# Patient Record
Sex: MALE | Race: WHITE | NOT HISPANIC OR LATINO | URBAN - METROPOLITAN AREA
[De-identification: names, ages, dates, MRNs, and addresses within clinical notes are randomized per-mention and may not be internally consistent; named-entity substitution may affect disease eponyms.]

---

## 2018-04-04 ENCOUNTER — OUTPATIENT (OUTPATIENT)
Dept: OUTPATIENT SERVICES | Facility: HOSPITAL | Age: 63
LOS: 1 days | Discharge: HOME | End: 2018-04-04

## 2018-04-04 VITALS
DIASTOLIC BLOOD PRESSURE: 97 MMHG | RESPIRATION RATE: 15 BRPM | HEART RATE: 68 BPM | TEMPERATURE: 98 F | WEIGHT: 205.03 LBS | OXYGEN SATURATION: 98 % | SYSTOLIC BLOOD PRESSURE: 175 MMHG | HEIGHT: 70 IN

## 2018-04-04 LAB
BASOPHILS # BLD AUTO: 0.04 K/UL — SIGNIFICANT CHANGE UP (ref 0–0.2)
BASOPHILS NFR BLD AUTO: 0.5 % — SIGNIFICANT CHANGE UP (ref 0–1)
EOSINOPHIL # BLD AUTO: 0.05 K/UL — SIGNIFICANT CHANGE UP (ref 0–0.7)
EOSINOPHIL NFR BLD AUTO: 0.7 % — SIGNIFICANT CHANGE UP (ref 0–8)
HCT VFR BLD CALC: 34.8 % — LOW (ref 42–52)
HCT VFR BLD CALC: 37.2 % — LOW (ref 42–52)
HGB BLD-MCNC: 11.8 G/DL — LOW (ref 14–18)
HGB BLD-MCNC: 12.6 G/DL — LOW (ref 14–18)
IMM GRANULOCYTES NFR BLD AUTO: 0.3 % — SIGNIFICANT CHANGE UP (ref 0.1–0.3)
LYMPHOCYTES # BLD AUTO: 1.53 K/UL — SIGNIFICANT CHANGE UP (ref 1.2–3.4)
LYMPHOCYTES # BLD AUTO: 20.1 % — LOW (ref 20.5–51.1)
MCHC RBC-ENTMCNC: 28.7 PG — SIGNIFICANT CHANGE UP (ref 27–31)
MCHC RBC-ENTMCNC: 28.7 PG — SIGNIFICANT CHANGE UP (ref 27–31)
MCHC RBC-ENTMCNC: 33.9 G/DL — SIGNIFICANT CHANGE UP (ref 32–37)
MCHC RBC-ENTMCNC: 33.9 G/DL — SIGNIFICANT CHANGE UP (ref 32–37)
MCV RBC AUTO: 84.7 FL — SIGNIFICANT CHANGE UP (ref 80–94)
MCV RBC AUTO: 84.7 FL — SIGNIFICANT CHANGE UP (ref 80–94)
MONOCYTES # BLD AUTO: 0.68 K/UL — HIGH (ref 0.1–0.6)
MONOCYTES NFR BLD AUTO: 8.9 % — SIGNIFICANT CHANGE UP (ref 1.7–9.3)
NEUTROPHILS # BLD AUTO: 5.28 K/UL — SIGNIFICANT CHANGE UP (ref 1.4–6.5)
NEUTROPHILS NFR BLD AUTO: 69.5 % — SIGNIFICANT CHANGE UP (ref 42.2–75.2)
NRBC # BLD: 0 /100 WBCS — SIGNIFICANT CHANGE UP (ref 0–0)
NRBC # BLD: 0 /100 WBCS — SIGNIFICANT CHANGE UP (ref 0–0)
PLATELET # BLD AUTO: 176 K/UL — SIGNIFICANT CHANGE UP (ref 130–400)
PLATELET # BLD AUTO: 179 K/UL — SIGNIFICANT CHANGE UP (ref 130–400)
RBC # BLD: 4.11 M/UL — LOW (ref 4.7–6.1)
RBC # BLD: 4.39 M/UL — LOW (ref 4.7–6.1)
RBC # FLD: 12.5 % — SIGNIFICANT CHANGE UP (ref 11.5–14.5)
RBC # FLD: 12.6 % — SIGNIFICANT CHANGE UP (ref 11.5–14.5)
WBC # BLD: 7.6 K/UL — SIGNIFICANT CHANGE UP (ref 4.8–10.8)
WBC # BLD: 7.7 K/UL — SIGNIFICANT CHANGE UP (ref 4.8–10.8)
WBC # FLD AUTO: 7.6 K/UL — SIGNIFICANT CHANGE UP (ref 4.8–10.8)
WBC # FLD AUTO: 7.7 K/UL — SIGNIFICANT CHANGE UP (ref 4.8–10.8)

## 2018-04-04 RX ORDER — PRASUGREL 5 MG/1
1 TABLET, FILM COATED ORAL
Qty: 30 | Refills: 1
Start: 2018-04-04

## 2018-04-04 RX ORDER — ASPIRIN/CALCIUM CARB/MAGNESIUM 324 MG
81 TABLET ORAL ONCE
Qty: 0 | Refills: 0 | Status: COMPLETED | OUTPATIENT
Start: 2018-04-04 | End: 2018-04-04

## 2018-04-04 RX ORDER — SODIUM CHLORIDE 9 MG/ML
500 INJECTION INTRAMUSCULAR; INTRAVENOUS; SUBCUTANEOUS
Qty: 0 | Refills: 0 | Status: DISCONTINUED | OUTPATIENT
Start: 2018-04-04 | End: 2018-04-19

## 2018-04-04 RX ORDER — METOPROLOL TARTRATE 50 MG
1 TABLET ORAL
Qty: 30 | Refills: 0
Start: 2018-04-04

## 2018-04-04 RX ADMIN — Medication 81 MILLIGRAM(S): at 11:25

## 2018-04-04 NOTE — H&P CARDIOLOGY - HISTORY OF PRESENT ILLNESS
62y old male patient with a past medical history of MVP, history of Rheumatic fever as a child and hyperlipidemia, presenting for a left heart and coronary angiogram to assess for CAD.  Outpatient stress test revealed mild dilatation of the LV. Mild to moderate MR.   No current complaints  Denies any chest pain or shortness of breath.

## 2018-04-04 NOTE — PROGRESS NOTE ADULT - SUBJECTIVE AND OBJECTIVE BOX
PRE-OPERATIVE DAY OF PROCEDURE EVALUATION DOCOMENTATION  I have personally seen and examined the patient.  I agree with the history and physical which I have reviewed and noted any changes below.  SIGNED ELECTRONICALLY BY NASH KAUR MD 04-04-18 @ 16:01                                         POST OPERATIVE PROCEDURAL DOCUMENTATION  PRE-OP DIAGNOSIS:       PROCEDURE:   LEFT HEART CATHERIZATION    Physician:  Nash Kaur MD  Assistant:  Yoko LESTER    ANESTHESIA TYPE:  [ X] Sedation  [ X] Local/Regional  [   ]General Anesthesia    ESTIMATED BLOOD LOSS:      less than 10 mL    CONDITION  [X ] Good  [   ] Fair  [   ] Serious  [   ] Critical      SPECIMENS REMOVED (IF APPLICABLE):   wire        IMPLANTS (IF APPLICABLE) stents x 3      FINDINGS:  LEFT HEART CATHERIZATION                                    LVEF55%:  LVEDP:    Left main nl    LAD:  mid 30-40%                        Left Circumflex: Patent      Right Cornary Artery:  Proximal 70% stent 3.5 x 22 martha  Distal 90% 2.75 x 22 martha        RIGHT HEART CATHERIZATION ( Not Performed)  PA:  PCW:  CO/CI:    PERCUTANEOUS CORONARY INTERVENTIONS:      COMPLICATIONS:  None      POST-OP DIAGNOSIS CAD    [ ] Normal Coronary Arteries  [ ] Luminal Irregularities  [ ] Non-obstructive CAD        PLAN OF CARE    [x ] D/C Home today   [ ]  D/C in AM  [ ] Return to In-patient bed  [ ] Admit for observation  [ ] Return for staged procedure:  [ ] CT Surgery consult called  [ ]  Continue DAPT, B-blocker & Statin therapy  [ ]  Medical Therapy  [ X] Aggressive risk factor modification. The patient should follow a low fat and low calorie diet.

## 2018-04-16 DIAGNOSIS — Z95.5 PRESENCE OF CORONARY ANGIOPLASTY IMPLANT AND GRAFT: ICD-10-CM

## 2018-04-16 DIAGNOSIS — I34.1 NONRHEUMATIC MITRAL (VALVE) PROLAPSE: ICD-10-CM

## 2018-04-16 DIAGNOSIS — I25.10 ATHEROSCLEROTIC HEART DISEASE OF NATIVE CORONARY ARTERY WITHOUT ANGINA PECTORIS: ICD-10-CM

## 2018-04-16 DIAGNOSIS — I42.9 CARDIOMYOPATHY, UNSPECIFIED: ICD-10-CM

## 2018-04-18 DIAGNOSIS — E11.9 TYPE 2 DIABETES MELLITUS WITHOUT COMPLICATIONS: ICD-10-CM

## 2018-04-18 DIAGNOSIS — G47.33 OBSTRUCTIVE SLEEP APNEA (ADULT) (PEDIATRIC): ICD-10-CM

## 2018-04-18 DIAGNOSIS — E78.00 PURE HYPERCHOLESTEROLEMIA, UNSPECIFIED: ICD-10-CM

## 2018-04-18 DIAGNOSIS — I10 ESSENTIAL (PRIMARY) HYPERTENSION: ICD-10-CM

## 2018-04-18 DIAGNOSIS — I25.2 OLD MYOCARDIAL INFARCTION: ICD-10-CM

## 2020-01-23 PROBLEM — I00 RHEUMATIC FEVER WITHOUT HEART INVOLVEMENT: Chronic | Status: ACTIVE | Noted: 2018-04-04

## 2020-01-23 PROBLEM — E78.5 HYPERLIPIDEMIA, UNSPECIFIED: Chronic | Status: ACTIVE | Noted: 2018-04-04

## 2020-01-23 PROBLEM — I34.1 NONRHEUMATIC MITRAL (VALVE) PROLAPSE: Chronic | Status: ACTIVE | Noted: 2018-04-04

## 2020-01-29 ENCOUNTER — INPATIENT (INPATIENT)
Facility: HOSPITAL | Age: 65
LOS: 0 days | Discharge: HOME | End: 2020-01-30
Attending: INTERNAL MEDICINE | Admitting: INTERNAL MEDICINE
Payer: MEDICARE

## 2020-01-29 ENCOUNTER — OUTPATIENT (OUTPATIENT)
Dept: OUTPATIENT SERVICES | Facility: HOSPITAL | Age: 65
LOS: 1 days | Discharge: HOME | End: 2020-01-29

## 2020-01-29 VITALS
DIASTOLIC BLOOD PRESSURE: 74 MMHG | HEART RATE: 74 BPM | HEIGHT: 70 IN | TEMPERATURE: 97 F | RESPIRATION RATE: 20 BRPM | WEIGHT: 213.41 LBS | SYSTOLIC BLOOD PRESSURE: 157 MMHG

## 2020-01-29 DIAGNOSIS — Z87.891 PERSONAL HISTORY OF NICOTINE DEPENDENCE: ICD-10-CM

## 2020-01-29 DIAGNOSIS — Z95.5 PRESENCE OF CORONARY ANGIOPLASTY IMPLANT AND GRAFT: ICD-10-CM

## 2020-01-29 DIAGNOSIS — E78.5 HYPERLIPIDEMIA, UNSPECIFIED: ICD-10-CM

## 2020-01-29 DIAGNOSIS — Z90.5 ACQUIRED ABSENCE OF KIDNEY: Chronic | ICD-10-CM

## 2020-01-29 DIAGNOSIS — I25.118 ATHEROSCLEROTIC HEART DISEASE OF NATIVE CORONARY ARTERY WITH OTHER FORMS OF ANGINA PECTORIS: ICD-10-CM

## 2020-01-29 DIAGNOSIS — I34.1 NONRHEUMATIC MITRAL (VALVE) PROLAPSE: ICD-10-CM

## 2020-01-29 DIAGNOSIS — I10 ESSENTIAL (PRIMARY) HYPERTENSION: ICD-10-CM

## 2020-01-29 DIAGNOSIS — I42.9 CARDIOMYOPATHY, UNSPECIFIED: ICD-10-CM

## 2020-01-29 DIAGNOSIS — Z79.82 LONG TERM (CURRENT) USE OF ASPIRIN: ICD-10-CM

## 2020-01-29 DIAGNOSIS — I20.8 OTHER FORMS OF ANGINA PECTORIS: ICD-10-CM

## 2020-01-29 LAB
ANION GAP SERPL CALC-SCNC: 11 MMOL/L — SIGNIFICANT CHANGE UP (ref 7–14)
ANION GAP SERPL CALC-SCNC: 14 MMOL/L — SIGNIFICANT CHANGE UP (ref 7–14)
BUN SERPL-MCNC: 14 MG/DL — SIGNIFICANT CHANGE UP (ref 10–20)
BUN SERPL-MCNC: 17 MG/DL — SIGNIFICANT CHANGE UP (ref 10–20)
CALCIUM SERPL-MCNC: 9.6 MG/DL — SIGNIFICANT CHANGE UP (ref 8.5–10.1)
CALCIUM SERPL-MCNC: 9.8 MG/DL — SIGNIFICANT CHANGE UP (ref 8.5–10.1)
CHLORIDE SERPL-SCNC: 104 MMOL/L — SIGNIFICANT CHANGE UP (ref 98–110)
CHLORIDE SERPL-SCNC: 105 MMOL/L — SIGNIFICANT CHANGE UP (ref 98–110)
CO2 SERPL-SCNC: 24 MMOL/L — SIGNIFICANT CHANGE UP (ref 17–32)
CO2 SERPL-SCNC: 24 MMOL/L — SIGNIFICANT CHANGE UP (ref 17–32)
CREAT SERPL-MCNC: 0.9 MG/DL — SIGNIFICANT CHANGE UP (ref 0.7–1.5)
CREAT SERPL-MCNC: 0.9 MG/DL — SIGNIFICANT CHANGE UP (ref 0.7–1.5)
GLUCOSE SERPL-MCNC: 90 MG/DL — SIGNIFICANT CHANGE UP (ref 70–99)
GLUCOSE SERPL-MCNC: 93 MG/DL — SIGNIFICANT CHANGE UP (ref 70–99)
HCT VFR BLD CALC: 37.1 % — LOW (ref 42–52)
HCT VFR BLD CALC: 39.4 % — LOW (ref 42–52)
HGB BLD-MCNC: 12.7 G/DL — LOW (ref 14–18)
HGB BLD-MCNC: 13.6 G/DL — LOW (ref 14–18)
MCHC RBC-ENTMCNC: 29.7 PG — SIGNIFICANT CHANGE UP (ref 27–31)
MCHC RBC-ENTMCNC: 30.2 PG — SIGNIFICANT CHANGE UP (ref 27–31)
MCHC RBC-ENTMCNC: 34.2 G/DL — SIGNIFICANT CHANGE UP (ref 32–37)
MCHC RBC-ENTMCNC: 34.5 G/DL — SIGNIFICANT CHANGE UP (ref 32–37)
MCV RBC AUTO: 86.7 FL — SIGNIFICANT CHANGE UP (ref 80–94)
MCV RBC AUTO: 87.6 FL — SIGNIFICANT CHANGE UP (ref 80–94)
NRBC # BLD: 0 /100 WBCS — SIGNIFICANT CHANGE UP (ref 0–0)
NRBC # BLD: 0 /100 WBCS — SIGNIFICANT CHANGE UP (ref 0–0)
PLATELET # BLD AUTO: 209 K/UL — SIGNIFICANT CHANGE UP (ref 130–400)
PLATELET # BLD AUTO: 214 K/UL — SIGNIFICANT CHANGE UP (ref 130–400)
POTASSIUM SERPL-MCNC: 4 MMOL/L — SIGNIFICANT CHANGE UP (ref 3.5–5)
POTASSIUM SERPL-MCNC: 4.2 MMOL/L — SIGNIFICANT CHANGE UP (ref 3.5–5)
POTASSIUM SERPL-SCNC: 4 MMOL/L — SIGNIFICANT CHANGE UP (ref 3.5–5)
POTASSIUM SERPL-SCNC: 4.2 MMOL/L — SIGNIFICANT CHANGE UP (ref 3.5–5)
RBC # BLD: 4.28 M/UL — LOW (ref 4.7–6.1)
RBC # BLD: 4.5 M/UL — LOW (ref 4.7–6.1)
RBC # FLD: 12.1 % — SIGNIFICANT CHANGE UP (ref 11.5–14.5)
RBC # FLD: 12.3 % — SIGNIFICANT CHANGE UP (ref 11.5–14.5)
SODIUM SERPL-SCNC: 139 MMOL/L — SIGNIFICANT CHANGE UP (ref 135–146)
SODIUM SERPL-SCNC: 143 MMOL/L — SIGNIFICANT CHANGE UP (ref 135–146)
WBC # BLD: 6.76 K/UL — SIGNIFICANT CHANGE UP (ref 4.8–10.8)
WBC # BLD: 8.63 K/UL — SIGNIFICANT CHANGE UP (ref 4.8–10.8)
WBC # FLD AUTO: 6.76 K/UL — SIGNIFICANT CHANGE UP (ref 4.8–10.8)
WBC # FLD AUTO: 8.63 K/UL — SIGNIFICANT CHANGE UP (ref 4.8–10.8)

## 2020-01-29 PROCEDURE — 93010 ELECTROCARDIOGRAM REPORT: CPT

## 2020-01-29 RX ORDER — ASPIRIN/CALCIUM CARB/MAGNESIUM 324 MG
81 TABLET ORAL DAILY
Refills: 0 | Status: DISCONTINUED | OUTPATIENT
Start: 2020-01-29 | End: 2020-01-30

## 2020-01-29 RX ORDER — LOSARTAN POTASSIUM 100 MG/1
25 TABLET, FILM COATED ORAL DAILY
Refills: 0 | Status: DISCONTINUED | OUTPATIENT
Start: 2020-01-29 | End: 2020-01-30

## 2020-01-29 RX ORDER — TICAGRELOR 90 MG/1
1 TABLET ORAL
Qty: 60 | Refills: 0
Start: 2020-01-29 | End: 2020-02-27

## 2020-01-29 RX ORDER — SODIUM CHLORIDE 9 MG/ML
1000 INJECTION INTRAMUSCULAR; INTRAVENOUS; SUBCUTANEOUS
Refills: 0 | Status: DISCONTINUED | OUTPATIENT
Start: 2020-01-29 | End: 2020-01-30

## 2020-01-29 RX ORDER — TICAGRELOR 90 MG/1
90 TABLET ORAL EVERY 12 HOURS
Refills: 0 | Status: DISCONTINUED | OUTPATIENT
Start: 2020-01-29 | End: 2020-01-30

## 2020-01-29 RX ORDER — ATORVASTATIN CALCIUM 80 MG/1
40 TABLET, FILM COATED ORAL AT BEDTIME
Refills: 0 | Status: DISCONTINUED | OUTPATIENT
Start: 2020-01-29 | End: 2020-01-30

## 2020-01-29 RX ORDER — ATORVASTATIN CALCIUM 80 MG/1
1 TABLET, FILM COATED ORAL
Qty: 0 | Refills: 0 | DISCHARGE

## 2020-01-29 RX ORDER — METOPROLOL TARTRATE 50 MG
25 TABLET ORAL DAILY
Refills: 0 | Status: DISCONTINUED | OUTPATIENT
Start: 2020-01-29 | End: 2020-01-30

## 2020-01-29 RX ADMIN — TICAGRELOR 90 MILLIGRAM(S): 90 TABLET ORAL at 18:05

## 2020-01-29 NOTE — H&P CARDIOLOGY - HISTORY OF PRESENT ILLNESS
64y Male here for cardiac catheterization. The patient complains of CP on exertion.  Patient has had 1 prior cardiac catheterization.  + ve stress test 01-29-20 @ 12:30    relevant PMHx:  [x ] HTN  [ ] DM   [x ] CAD  [x ] HLD  [x ] Family Hx of CAD    Other:    Pre cath note:    indication:  [ ] STEMI                [ ] NSTEMI                 [ ] Unstable Angina                     [x ] Stable Angina     non-invasive testing:    + ve stress Echo as OP           result: [ ] high risk  [x] intermediate risk  [ ] low risk    Anti- Anginal medications:                    [ ] not used                       [x ] used                   [ ] not used but strong indication not to use    Ejection Fraction                   [ ] >30%            [ ] <30%    COPD                   [ ] mild (on chronic bronchodilators)  [ ] moderate (on chronic steroid therapy)      [ ] severe (indication for home O2 or PACO2 >50)    Other risk factors:                     [ ] Previous MI                    [ ] CVA/ stroke                    [ ] carotid stent/ CEA                    [ ] PVD- (arterial aneurysm, non-palpable pulses, tortuous vessel with inability to insert catheter, infra-renal dissection, renal or subclavian artery stenosis)                    [ ] Renal Failure                    [ ] diabetic                    [ ] previous CABG

## 2020-01-29 NOTE — CHART NOTE - NSCHARTNOTEFT_GEN_A_CORE
Preliminary Cardiac Catheterization Post-Procedure Report:01-29-20 @ 16:02    After risks, benefits and alternatives of the procedure were explained, consent was signed and placed in the medical record prior to initiation of procedure.  Procedural timeout was performed prior to initiation of procedure in presence of cath lab staff. Cardiac catheterization was performed without any significant complications. Post-procedure vital signs were stable.    Procedure Performed:  [x ] Left Heart Catheterization  [ ] Right Heart Catheterization  [x ] Percutaneous Coronary Intervention    Primary Physician: Dr. Vincent MD  Assistant(s): Dr. Brannon MD and  Dr. Jesus MD    Preliminary Procedure Summary (Official full report to follow)    Pre-procedure diagnosis: abnormal stress Echo, Stable Angina CCS 2  Post Procedure Diagnosis/Impression:    Left Heart Catheterization:  approximate EF%: n/a  [ ] Normal Coronary Arteries  [ ] Luminal Irregularities  [ ] non-obstructive CAD  [x] 1 vessel coronary artery disease       Anesthesia Type  [x] conscious sedation  [x] local/regional anesthesia  [  ] general anesthesia    Estimated Blood Loss  [x ] less than 30 ml    Amount of Contrast used: 275 ml    Access  [ ] Rt. Femoral A  [ ] Rt. Femoral V  [x ] Rt. Radial A --> closed with D-stat  [ ] Rt. Brachial V    Condition of patient after procedure  [x] stable  [  ] guarded  [  ] satisfactory     CATH SUMMARY/FINDINGS:  Coronary circulation: The coronary circulation is right dominant. There was 1-vessel coronary artery disease (LAD). Left main: Angiography showed minor luminal irregularities with no flow limiting lesions. LAD: Angiography showed a single discrete lesion. Proximal LAD: There was a diffuse 60 % stenosis. An intervention was performed. Distal vessel angiography showed minor luminal irregularities. 2nd diagonal: The vessel was medium to large sized. Angiography showed minor luminal irregularities with no flow limiting lesions. Proximal circumflex: Angiography showed mild atherosclerosis with no flow limiting lesions. Distal vessel angiography showed minor luminal irregularities. 1st obtuse marginal: Angiography showed minor luminal irregularities with no flow limiting lesions. RCA: Angiography showed minor luminal irregularities with no flow limiting lesions, patent stents in mid and distal RCA.     Lesion intervention: A percutaneous intervention was performed on the lesion in the proximal LAD. This was not a bifurcation lesion. This was an ACC/AHA type C "high risk" lesion for intervention. There was no evidence of the transient no-reflow phenomenon. The residual lesion demonstrated no evidence of thrombus. There was ESTRELLITA 3 flow before the procedure and ESTRELLITA 3 flow after the procedure. There was no dissection.   Vessel setup was performed. A XBLAD 3.5 guiding catheter was used to intubate the vessel. A Volcano Prime Wire Verrata 185cm wire was used to cross the lesion.   Vessel setup was performed. A [employer] Moses Blue 180cm wire was used to protect a branch.   Balloon angioplasty was performed, using a 2.5 x 12 [place] RX balloon, with 5 inflations and a maximum inflation pressure of 12 hattie.   Vessel setup was performed. A BMW wire was used as a cleo wire in the LAD.   iFR was measured. A Volcano Prime Wire Verrata 185cm Doppler flow wire was advanced across the stenosis. By coronary flow reserve measurement, the stenosis is hemodynamically significant (0.89).   Balloon angioplasty was performed, using a 3.0 x 12 [place] Quantum [place] RX balloon, with 4 inflations and a maximum inflation pressure of 10 hattie.   A 3.0 X 18 [place] YOCASTA RX drug-eluting stent was placed across the lesion and deployed at a maximum inflation pressure of 10 hattie.   Balloon angioplasty was performed, using a 3.0 x 12 [place] Euphora balloon, with 3 inflations and a maximum inflation pressure of 12 hattie.   A 3.5 X 18 [place] YOCASTA RX drug-eluting stent was placed across the lesion and deployed at a maximum inflation pressure of 10 hattie.   Balloon angioplasty was performed, using a 3.5 x 12 [place] Euphora balloon, with 1 inflations and a maximum inflation pressure of 14 hattie.   Balloon angioplasty was performed, using a 3.5 x 8 NC Quantum [place] RX balloon, with 6 inflations and a maximum inflation pressure of 20 hattie.     Specimens obtained: n/a    Implants: YOCASTA x 2 (3.0 X 18 [place] YOCASTA RX drug-eluting stent and 3.5 X 18 [place] YOCASTA RX drug-eluting stent)    Follow-up Care:  [ ] D/C Home today  [ ] Return to In-patient bed  [x ] Admit to: telemetry/3c  [ ] Return for staged procedure:  [ ] CT Surgery consult called  [x ] DAPT, statin, BB, ACEI  [x ] intensive medical management  [ ] ** EPS/nephrology evaluation requested Preliminary Cardiac Catheterization Post-Procedure Report:01-29-20 @ 16:02    After risks, benefits and alternatives of the procedure were explained, consent was signed and placed in the medical record prior to initiation of procedure.  Procedural timeout was performed prior to initiation of procedure in presence of cath lab staff. Cardiac catheterization was performed without any significant complications. Post-procedure vital signs were stable.    Procedure Performed:  [x ] Left Heart Catheterization  [ ] Right Heart Catheterization  [x ] Percutaneous Coronary Intervention    Primary Physician: Dr. Vincent MD  Assistant(s): Dr. Brannon MD and  Dr. Jesus MD    Preliminary Procedure Summary (Official full report to follow)    Pre-procedure diagnosis: abnormal stress Echo, Stable Angina CCS 2  Post Procedure Diagnosis/Impression:    Left Heart Catheterization:  approximate EF%: n/a  [ ] Normal Coronary Arteries  [ ] Luminal Irregularities  [ ] non-obstructive CAD  [x] 1 vessel coronary artery disease       Anesthesia Type  [x] conscious sedation  [x] local/regional anesthesia  [  ] general anesthesia    Estimated Blood Loss  [x ] less than 30 ml    Amount of Contrast used: 275 ml    Access  [ ] Rt. Femoral A  [ ] Rt. Femoral V  [x ] Rt. Radial A --> closed with D-stat  [ ] Rt. Brachial V    Condition of patient after procedure  [x] stable  [  ] guarded  [  ] satisfactory     CATH SUMMARY/FINDINGS:  Coronary circulation: The coronary circulation is right dominant. There was 1-vessel coronary artery disease (LAD). Left main: Angiography showed minor luminal irregularities with no flow limiting lesions. LAD: Angiography showed a single discrete lesion. Proximal LAD: There was a diffuse 60 % stenosis. An intervention was performed. Distal vessel angiography showed minor luminal irregularities. 2nd diagonal: The vessel was medium to large sized. Angiography showed minor luminal irregularities with no flow limiting lesions. Proximal circumflex: Angiography showed mild atherosclerosis with no flow limiting lesions. Distal vessel angiography showed minor luminal irregularities. 1st obtuse marginal: Angiography showed minor luminal irregularities with no flow limiting lesions. RCA: Angiography showed minor luminal irregularities with no flow limiting lesions, patent stents in mid and distal RCA.     Lesion intervention: A percutaneous intervention was performed on the lesion in the proximal LAD. This was not a bifurcation lesion. This was an ACC/AHA type C "high risk" lesion for intervention. There was no evidence of the transient no-reflow phenomenon. The residual lesion demonstrated no evidence of thrombus. There was ESTRELLITA 3 flow before the procedure and ESTRELLITA 3 flow after the procedure. There was no dissection.   Vessel setup was performed. A XBLAD 3.5 guiding catheter was used to intubate the vessel. A Volcano Prime Wire Verrata 185cm wire was used to cross the lesion.   Vessel setup was performed. A [employer] Moses Blue 180cm wire was used to protect a branch.   Balloon angioplasty was performed, using a 2.5 x 12 [place] RX balloon, with 5 inflations and a maximum inflation pressure of 12 hattie.   Vessel setup was performed. A BMW wire was used as a cleo wire in the LAD.   iFR was measured. A Volcano Prime Wire Verrata 185cm Doppler flow wire was advanced across the stenosis. By coronary flow reserve measurement, the stenosis is hemodynamically significant (0.89).   Balloon angioplasty was performed, using a 3.0 x 12 NC Quantum [place] RX balloon, with 4 inflations and a maximum inflation pressure of 10 hattie.   A 3.0 X 18 [place] YOCASTA RX drug-eluting stent was placed across the lesion and deployed at a maximum inflation pressure of 10 hattie.   Balloon angioplasty was performed, using a 3.0 x 12 [place] Euphora balloon, with 3 inflations and a maximum inflation pressure of 12 hattie.   A 3.5 X 18 [place] YOCASTA RX drug-eluting stent was placed across the lesion and deployed at a maximum inflation pressure of 10 hattie.   Balloon angioplasty was performed, using a 3.5 x 12 [place] Euphora balloon, with 1 inflations and a maximum inflation pressure of 14 hattie.   Balloon angioplasty was performed, using a 3.5 x 8 NC Quantum [place] RX balloon, with 6 inflations and a maximum inflation pressure of 20 hattie.     Specimens obtained: n/a    Implants: YOCASTA x 2 (3.0 X 18 [place] YOCASTA RX drug-eluting stent and 3.5 X 18 [place] YOCASTA RX drug-eluting stent)    Follow-up Care:  [ ] D/C Home today  [ ] Return to In-patient bed  [x ] Admit to: telemetry/3c  [ ] Return for staged procedure:  [ ] CT Surgery consult called  [x ] DAPT, statin, BB, ACEI  [x ] intensive medical management  [ ] ** EPS/nephrology evaluation requested    agree

## 2020-01-30 ENCOUNTER — TRANSCRIPTION ENCOUNTER (OUTPATIENT)
Age: 65
End: 2020-01-30

## 2020-01-30 VITALS — OXYGEN SATURATION: 97 %

## 2020-01-30 PROCEDURE — 93010 ELECTROCARDIOGRAM REPORT: CPT

## 2020-01-30 RX ADMIN — LOSARTAN POTASSIUM 25 MILLIGRAM(S): 100 TABLET, FILM COATED ORAL at 06:33

## 2020-01-30 RX ADMIN — Medication 81 MILLIGRAM(S): at 11:10

## 2020-01-30 RX ADMIN — TICAGRELOR 90 MILLIGRAM(S): 90 TABLET ORAL at 06:32

## 2020-01-30 RX ADMIN — Medication 25 MILLIGRAM(S): at 06:32

## 2020-01-30 NOTE — DISCHARGE NOTE PROVIDER - HOSPITAL COURSE
NAVEEN DAVIS     64y Male    PAST MEDICAL & SURGICAL HISTORY:    Hyperlipidemia, unspecified hyperlipidemia type    Rheumatic fever    Mitral valve prolapse    H/O partial nephrectomy             HPI:    64y Male here for cardiac catheterization. The patient complains of CP on exertion.  Patient has had 1 prior cardiac catheterization.          Right Radial: Access site soft, Small sized soft hematoma noted, small area of ecchymosis, no pain, + pulses/same as baseline, no sign of infection, no numbness        ECG reviewed, patient ambulated without symptoms, no events on TELE         A/P:  I discussed the case with Cardiologist Dr. Sesay and recommend the following:        S/P PCI:  YOCASTA x 2 (3.0 X 18 [place] YOCASTA RX drug-eluting stent and 3.5 X 18 [place] YOCASTA RX drug-eluting stent)        	     Continue DAPT ( Aspirin 81 mg PO Daily and  Brilinta 90 mg po q 12h ),  B-Blocker, Ace, Statin Therapy                     Patient pharmacy called in for Brilinta and it is 24.99  $ per month                     Patient agreeing to take DAPT for at least one year or as directed by cardiologist                      Pt given instructions on importance of taking antiplatelet medication or risk acute stent thrombosis/death                     Post cath instructions, access site care and activity restrictions reviewed with patient                       Discussed with patient to return to hospital if experience chest pain, shortness breath, dizziness and site bleeding                     Aggressive risk factor modification, diet counseling, smoking cessation discussed with patient                         Can discharge patient from cardiac standpoint                     Follow up with Cardiology Dr. Kaur  in one to two weeks.  Instructed to call and make an appointment

## 2020-01-30 NOTE — PROGRESS NOTE ADULT - REASON FOR ADMISSION
here for cardiac catheterization.  + ve stress test here for cardiac catheterization.  +  stress test

## 2020-01-30 NOTE — PROGRESS NOTE ADULT - SUBJECTIVE AND OBJECTIVE BOX
Cardiology Follow up    NAVEEN DAVIS   64y Male  PAST MEDICAL & SURGICAL HISTORY:  Hyperlipidemia, unspecified hyperlipidemia type  Rheumatic fever  Mitral valve prolapse  H/O partial nephrectomy       HPI:  64y Male here for cardiac catheterization. The patient complains of CP on exertion.  Patient has had 1 prior cardiac catheterization.  + ve stress test 01-29-20 @ 12:30    relevant PMHx:  [x ] HTN  [ ] DM   [x ] CAD  [x ] HLD  [x ] Family Hx of CAD    Other:    Pre cath note:    indication:  [ ] STEMI                [ ] NSTEMI                 [ ] Unstable Angina                     [x ] Stable Angina     non-invasive testing:    + ve stress Echo as OP           result: [ ] high risk  [x] intermediate risk  [ ] low risk    Anti- Anginal medications:                    [ ] not used                       [x ] used                   [ ] not used but strong indication not to use    Ejection Fraction                   [ ] >30%            [ ] <30%    COPD                   [ ] mild (on chronic bronchodilators)  [ ] moderate (on chronic steroid therapy)      [ ] severe (indication for home O2 or PACO2 >50)    Other risk factors:                     [ ] Previous MI                    [ ] CVA/ stroke                    [ ] carotid stent/ CEA                    [ ] PVD- (arterial aneurysm, non-palpable pulses, tortuous vessel with inability to insert catheter, infra-renal dissection, renal or subclavian artery stenosis)                    [ ] Renal Failure                    [ ] diabetic                    [ ] previous CABG (29 Jan 2020 12:29)    Allergies    No Known Allergies        Patient without complaints. Pt ambulated without issues/symptoms  Denies CP, SOB, palpitations, or dizziness  No events on telemetry overnight    Vital Signs Last 24 Hrs  T(C): 36.6 (30 Jan 2020 05:41), Max: 36.6 (30 Jan 2020 05:41)  T(F): 97.9 (30 Jan 2020 05:41), Max: 97.9 (30 Jan 2020 05:41)  HR: 70 (30 Jan 2020 05:41) (70 - 74)  BP: 111/63 (30 Jan 2020 05:41) (111/63 - 157/74)  BP(mean): --  RR: 17 (30 Jan 2020 05:41) (17 - 20)  SpO2: --    MEDICATIONS  (STANDING):  aspirin enteric coated 81 milliGRAM(s) Oral daily  atorvastatin 40 milliGRAM(s) Oral at bedtime  losartan Oral Tab/Cap - Peds 25 milliGRAM(s) Oral daily  metoprolol succinate ER 25 milliGRAM(s) Oral daily  sodium chloride 0.9%. 1000 milliLiter(s) (100 mL/Hr) IV Continuous <Continuous>  ticagrelor 90 milliGRAM(s) Oral every 12 hours    MEDICATIONS  (PRN):      PHYSICAL EXAM:           CONSTITUTIONAL: Well-developed; well-nourished; in no acute distress  	SKIN: warm, dry  	HEAD: Normocephalic; atraumatic  	EYES: PERRL.  	ENT: No nasal discharge, airway clear, mucous membranes moist  	NECK: Supple; non tender.  	CARD: +S1, +S2, no murmurs, gallops, or rubs. Regular rate and rhythm    	RESP: No wheezes, rales or rhonchi. CTA B/L  	ABD: soft ntnd, + BS x 4 quadrants  	EXT: moves all extremities,  no clubbing, cyanosis or edema  	NEURO: Alert and oriented x3, no focal deficits          PSYCH: Cooperative, appropriate          VASCULAR:  +2 Rad / +2 PTs / + 2 DPs          EXTREMITY:             	   Right Radial: Access site soft, Small dime sized hematoma noted, mild ecchymosis, no pain, + pulses/same as baseline, no sign of infection, no numbness            ECG:    P                                                                                                               LABS:                        12.7   8.63  )-----------( 214      ( 29 Jan 2020 21:32 )             37.1     01-29    139  |  104  |  17  ----------------------------<  90  4.0   |  24  |  0.9    Ca    9.6      29 Jan 2020 21:32              A/P:  I discussed the case with Cardiologist Dr. Sesay and recommend the following:    S/P PCI:  YOCASTA x 2 (3.0 X 18 [place] YOCASTA RX drug-eluting stent and 3.5 X 18 [place] YOCASTA RX drug-eluting stent)      	         Continue DAPT ( Aspirin 81 mg PO Daily and  Brilinta 90 mg po q 12h ),  B-Blocker, Ace, Statin Therapy                   Patient pharmacy called in for Brilinta and it is 24.99  $ per month                   Patient agreeing to take DAPT for at least one year or as directed by cardiologist                    Pt given instructions on importance of taking antiplatelet medication or risk acute stent thrombosis/death                   Post cath instructions, access site care and activity restrictions reviewed with patient                     Discussed with patient to return to hospital if experience chest pain, shortness breath, dizziness and site bleeding                   Aggressive risk factor modification, diet counseling, smoking cessation discussed with patient                       Can discharge patient from cardiac standpoint after evaluation by Dr Kaur, ambulating without symptoms and access site wnl and ECG reviewed                    Follow up with Cardiology Dr. Kaur  in one to two weeks.  Instructed to call and make an appointment Cardiology Follow up    NAVEEN DAVIS   64y Male  PAST MEDICAL & SURGICAL HISTORY:  Hyperlipidemia, unspecified hyperlipidemia type  Rheumatic fever  Mitral valve prolapse  H/O partial nephrectomy       HPI:  64y Male here for cardiac catheterization. The patient complains of CP on exertion.  Patient has had 1 prior cardiac catheterization.  + ve stress test 01-29-20 @ 12:30    relevant PMHx:  [x ] HTN  [ ] DM   [x ] CAD  [x ] HLD  [x ] Family Hx of CAD    Other:    Pre cath note:    indication:  [ ] STEMI                [ ] NSTEMI                 [ ] Unstable Angina                     [x ] Stable Angina     non-invasive testing:    + ve stress Echo as OP           result: [ ] high risk  [x] intermediate risk  [ ] low risk    Anti- Anginal medications:                    [ ] not used                       [x ] used                   [ ] not used but strong indication not to use    Ejection Fraction                   [ ] >30%            [ ] <30%    COPD                   [ ] mild (on chronic bronchodilators)  [ ] moderate (on chronic steroid therapy)      [ ] severe (indication for home O2 or PACO2 >50)    Other risk factors:                     [ ] Previous MI                    [ ] CVA/ stroke                    [ ] carotid stent/ CEA                    [ ] PVD- (arterial aneurysm, non-palpable pulses, tortuous vessel with inability to insert catheter, infra-renal dissection, renal or subclavian artery stenosis)                    [ ] Renal Failure                    [ ] diabetic                    [ ] previous CABG (29 Jan 2020 12:29)    Allergies    No Known Allergies        Patient without complaints. Pt ambulated without issues/symptoms  Denies CP, SOB, palpitations, or dizziness  No events on telemetry overnight    Vital Signs Last 24 Hrs  T(C): 36.6 (30 Jan 2020 05:41), Max: 36.6 (30 Jan 2020 05:41)  T(F): 97.9 (30 Jan 2020 05:41), Max: 97.9 (30 Jan 2020 05:41)  HR: 70 (30 Jan 2020 05:41) (70 - 74)  BP: 111/63 (30 Jan 2020 05:41) (111/63 - 157/74)  BP(mean): --  RR: 17 (30 Jan 2020 05:41) (17 - 20)  SpO2: --    MEDICATIONS  (STANDING):  aspirin enteric coated 81 milliGRAM(s) Oral daily  atorvastatin 40 milliGRAM(s) Oral at bedtime  losartan Oral Tab/Cap - Peds 25 milliGRAM(s) Oral daily  metoprolol succinate ER 25 milliGRAM(s) Oral daily  sodium chloride 0.9%. 1000 milliLiter(s) (100 mL/Hr) IV Continuous <Continuous>  ticagrelor 90 milliGRAM(s) Oral every 12 hours    MEDICATIONS  (PRN):      PHYSICAL EXAM:           CONSTITUTIONAL: Well-developed; well-nourished; in no acute distress  	SKIN: warm, dry  	HEAD: Normocephalic; atraumatic  	EYES: PERRL.  	ENT: No nasal discharge, airway clear, mucous membranes moist  	NECK: Supple; non tender.  	CARD: +S1, +S2, no murmurs, gallops, or rubs. Regular rate and rhythm    	RESP: No wheezes, rales or rhonchi. CTA B/L  	ABD: soft ntnd, + BS x 4 quadrants  	EXT: moves all extremities,  no clubbing, cyanosis or edema  	NEURO: Alert and oriented x3, no focal deficits          PSYCH: Cooperative, appropriate          VASCULAR:  +2 Rad / +2 PTs / + 2 DPs          EXTREMITY:             	   Right Radial: Access site soft, Small dime sized hematoma noted, mild ecchymosis, no pain, + pulses/same as baseline, no sign of infection, no numbness            ECG:    NSR, RATE 76  QT//445  No new ischemic changes                                                                                                            LABS:                        12.7   8.63  )-----------( 214      ( 29 Jan 2020 21:32 )             37.1     01-29    139  |  104  |  17  ----------------------------<  90  4.0   |  24  |  0.9    Ca    9.6      29 Jan 2020 21:32              A/P:  I discussed the case with Cardiologist Dr. Sesay and recommend the following:    S/P PCI:  YOCASTA x 2 (3.0 X 18 [place] YOCASTA RX drug-eluting stent and 3.5 X 18 [place] YOCASTA RX drug-eluting stent)      	         Continue DAPT ( Aspirin 81 mg PO Daily and  Brilinta 90 mg po q 12h ),  B-Blocker, Ace, Statin Therapy                   Patient pharmacy called in for Brilinta and it is 24.99  $ per month                   Patient agreeing to take DAPT for at least one year or as directed by cardiologist                    Pt given instructions on importance of taking antiplatelet medication or risk acute stent thrombosis/death                   Post cath instructions, access site care and activity restrictions reviewed with patient                     Discussed with patient to return to hospital if experience chest pain, shortness breath, dizziness and site bleeding                   Aggressive risk factor modification, diet counseling, smoking cessation discussed with patient                       Can discharge patient from cardiac standpoint after evaluation by Dr Kaur, ambulating without symptoms and access site wnl and ECG reviewed                    Follow up with Cardiology Dr. Kaur  in one to two weeks.  Instructed to call and make an appointment Cardiology Follow up    NAVEEN DAVIS   64y Male  PAST MEDICAL & SURGICAL HISTORY:  Hyperlipidemia, unspecified hyperlipidemia type  Rheumatic fever  Mitral valve prolapse  H/O partial nephrectomy       HPI:  64y Male here for cardiac catheterization. The patient complains of CP on exertion.  Patient has had 1 prior cardiac catheterization.  + stress test 01-29-20 @ 12:30    relevant PMHx:  [x ] HTN  [ ] DM   [x ] CAD  [x ] HLD  [x ] Family Hx of CAD    Other:    Pre cath note:    indication:  [ ] STEMI                [ ] NSTEMI                 [ ] Unstable Angina                     [x ] Stable Angina     non-invasive testing:    + ve stress Echo as OP           result: [ ] high risk  [x] intermediate risk  [ ] low risk    Anti- Anginal medications:                    [ ] not used                       [x ] used                   [ ] not used but strong indication not to use    Ejection Fraction                   [ ] >30%            [ ] <30%    COPD                   [ ] mild (on chronic bronchodilators)  [ ] moderate (on chronic steroid therapy)      [ ] severe (indication for home O2 or PACO2 >50)    Other risk factors:                     [ ] Previous MI                    [ ] CVA/ stroke                    [ ] carotid stent/ CEA                    [ ] PVD- (arterial aneurysm, non-palpable pulses, tortuous vessel with inability to insert catheter, infra-renal dissection, renal or subclavian artery stenosis)                    [ ] Renal Failure                    [ ] diabetic                    [ ] previous CABG (29 Jan 2020 12:29)    Allergies    No Known Allergies        Patient without complaints. Pt ambulated without issues/symptoms  Denies CP, SOB, palpitations, or dizziness  No events on telemetry overnight    Vital Signs Last 24 Hrs  T(C): 36.6 (30 Jan 2020 05:41), Max: 36.6 (30 Jan 2020 05:41)  T(F): 97.9 (30 Jan 2020 05:41), Max: 97.9 (30 Jan 2020 05:41)  HR: 70 (30 Jan 2020 05:41) (70 - 74)  BP: 111/63 (30 Jan 2020 05:41) (111/63 - 157/74)  BP(mean): --  RR: 17 (30 Jan 2020 05:41) (17 - 20)  SpO2: --    MEDICATIONS  (STANDING):  aspirin enteric coated 81 milliGRAM(s) Oral daily  atorvastatin 40 milliGRAM(s) Oral at bedtime  losartan Oral Tab/Cap - Peds 25 milliGRAM(s) Oral daily  metoprolol succinate ER 25 milliGRAM(s) Oral daily  sodium chloride 0.9%. 1000 milliLiter(s) (100 mL/Hr) IV Continuous <Continuous>  ticagrelor 90 milliGRAM(s) Oral every 12 hours    MEDICATIONS  (PRN):      PHYSICAL EXAM:           CONSTITUTIONAL: Well-developed; well-nourished; in no acute distress  	SKIN: warm, dry  	HEAD: Normocephalic; atraumatic  	EYES: PERRL.  	ENT: No nasal discharge, airway clear, mucous membranes moist  	NECK: Supple; non tender.  	CARD: +S1, +S2, no murmurs, gallops, or rubs. Regular rate and rhythm    	RESP: No wheezes, rales or rhonchi. CTA B/L  	ABD: soft ntnd, + BS x 4 quadrants  	EXT: moves all extremities,  no clubbing, cyanosis or edema  	NEURO: Alert and oriented x3, no focal deficits          PSYCH: Cooperative, appropriate          VASCULAR:  +2 Rad / +2 PTs / + 2 DPs          EXTREMITY:             	   Right Radial: Access site soft, Small dime sized hematoma noted, mild ecchymosis, no pain, + pulses/same as baseline, no sign of infection, no numbness            ECG:    NSR, RATE 76  QT//445  No new ischemic changes                                                                                                            LABS:                        12.7   8.63  )-----------( 214      ( 29 Jan 2020 21:32 )             37.1     01-29    139  |  104  |  17  ----------------------------<  90  4.0   |  24  |  0.9    Ca    9.6      29 Jan 2020 21:32              A/P:  I discussed the case with Cardiologist Dr. Sesay and recommend the following:    S/P PCI:  YOCASTA x 2 (3.0 X 18 [place] YOCASTA RX drug-eluting stent and 3.5 X 18 [place] YOCASTA RX drug-eluting stent)      	         Continue DAPT ( Aspirin 81 mg PO Daily and  Brilinta 90 mg po q 12h ),  B-Blocker, Ace, Statin Therapy                   Patient pharmacy called in for Brilinta and it is 24.99  $ per month                   Patient agreeing to take DAPT for at least one year or as directed by cardiologist                    Pt given instructions on importance of taking antiplatelet medication or risk acute stent thrombosis/death                   Post cath instructions, access site care and activity restrictions reviewed with patient                     Discussed with patient to return to hospital if experience chest pain, shortness breath, dizziness and site bleeding                   Aggressive risk factor modification, diet counseling, smoking cessation discussed with patient                       Can discharge patient from cardiac standpoint after evaluation by Dr Kaur, ambulating without symptoms and access site wnl and ECG reviewed                    Follow up with Cardiology Dr. Kaur  in one to two weeks.  Instructed to call and make an appointment Cardiology Follow up    NAVEEN DAVIS   64y Male  PAST MEDICAL & SURGICAL HISTORY:  Hyperlipidemia, unspecified hyperlipidemia type  Rheumatic fever  Mitral valve prolapse  H/O partial nephrectomy       HPI:  64y Male here for cardiac catheterization. The patient complains of CP on exertion.  Patient has had 1 prior cardiac catheterization.  + stress test 01-29-20 @ 12:30    relevant PMHx:  [x ] HTN  [ ] DM   [x ] CAD  [x ] HLD  [x ] Family Hx of CAD    Other:    Pre cath note:    indication:  [ ] STEMI                [ ] NSTEMI                 [ ] Unstable Angina                     [x ] Stable Angina     non-invasive testing:    + ve stress Echo as OP           result: [ ] high risk  [x] intermediate risk  [ ] low risk    Anti- Anginal medications:                    [ ] not used                       [x ] used                   [ ] not used but strong indication not to use    Ejection Fraction                   [ ] >30%            [ ] <30%    COPD                   [ ] mild (on chronic bronchodilators)  [ ] moderate (on chronic steroid therapy)      [ ] severe (indication for home O2 or PACO2 >50)    Other risk factors:                     [ ] Previous MI                    [ ] CVA/ stroke                    [ ] carotid stent/ CEA                    [ ] PVD- (arterial aneurysm, non-palpable pulses, tortuous vessel with inability to insert catheter, infra-renal dissection, renal or subclavian artery stenosis)                    [ ] Renal Failure                    [ ] diabetic                    [ ] previous CABG (29 Jan 2020 12:29)    Allergies    No Known Allergies        Patient without complaints. Pt ambulated without issues/symptoms  Denies CP, SOB, palpitations, or dizziness  No events on telemetry overnight    Vital Signs Last 24 Hrs  T(C): 36.6 (30 Jan 2020 05:41), Max: 36.6 (30 Jan 2020 05:41)  T(F): 97.9 (30 Jan 2020 05:41), Max: 97.9 (30 Jan 2020 05:41)  HR: 70 (30 Jan 2020 05:41) (70 - 74)  BP: 111/63 (30 Jan 2020 05:41) (111/63 - 157/74)  BP(mean): --  RR: 17 (30 Jan 2020 05:41) (17 - 20)  SpO2: --    MEDICATIONS  (STANDING):  aspirin enteric coated 81 milliGRAM(s) Oral daily  atorvastatin 40 milliGRAM(s) Oral at bedtime  losartan Oral Tab/Cap - Peds 25 milliGRAM(s) Oral daily  metoprolol succinate ER 25 milliGRAM(s) Oral daily  sodium chloride 0.9%. 1000 milliLiter(s) (100 mL/Hr) IV Continuous <Continuous>  ticagrelor 90 milliGRAM(s) Oral every 12 hours    MEDICATIONS  (PRN):      PHYSICAL EXAM:           CONSTITUTIONAL: Well-developed; well-nourished; in no acute distress  	SKIN: warm, dry  	HEAD: Normocephalic; atraumatic  	EYES: PERRL.  	ENT: No nasal discharge, airway clear, mucous membranes moist  	NECK: Supple; non tender.  	CARD: +S1, +S2, no murmurs, gallops, or rubs. Regular rate and rhythm    	RESP: No wheezes, rales or rhonchi. CTA B/L  	ABD: soft ntnd, + BS x 4 quadrants  	EXT: moves all extremities,  no clubbing, cyanosis or edema  	NEURO: Alert and oriented x3, no focal deficits          PSYCH: Cooperative, appropriate          VASCULAR:  +2 Rad / +2 PTs / + 2 DPs          EXTREMITY:             	   Right Radial: Access site soft, Small dime sized hematoma noted, mild ecchymosis, no pain, + pulses/same as baseline, no sign of infection, no numbness            ECG:   < from: 12 Lead ECG (01.30.20 @ 08:56) >  Ventricular Rate 76 BPM    Atrial Rate 76 BPM    P-R Interval 154 ms    QRS Duration 106 ms    Q-T Interval 396 ms    QTC Calculation(Bezet) 445 ms    P Axis 63 degrees    R Axis -19 degrees    T Axis 52 degrees    Diagnosis Line Normal sinus rhythm  Voltage criteria for left ventricular hypertrophy  Abnormal ECG    Confirmed by Vince Maxwell ([id number]) on 1/30/2020 9:47:34 AM    < end of copied text >     LABS:                        12.7   8.63  )-----------( 214      ( 29 Jan 2020 21:32 )             37.1     01-29    139  |  104  |  17  ----------------------------<  90  4.0   |  24  |  0.9    Ca    9.6      29 Jan 2020 21:32              A/P:  I discussed the case with Cardiologist Dr. Sesay and recommend the following:    S/P PCI:  YOCASTA x 2 (3.0 X 18 [place] YOCASTA RX drug-eluting stent and 3.5 X 18 [place] YOCASTA RX drug-eluting stent)      	         Continue DAPT ( Aspirin 81 mg PO Daily and  Brilinta 90 mg po q 12h ),  B-Blocker, Ace, Statin Therapy                   Patient pharmacy called in for Brilinta and it is 24.99  $ per month                   Patient agreeing to take DAPT for at least one year or as directed by cardiologist                    Pt given instructions on importance of taking antiplatelet medication or risk acute stent thrombosis/death                   Post cath instructions, access site care and activity restrictions reviewed with patient                     Discussed with patient to return to hospital if experience chest pain, shortness breath, dizziness and site bleeding                   Aggressive risk factor modification, diet counseling, smoking cessation discussed with patient                       Can discharge patient from cardiac standpoint after evaluation by Dr Kaur, ambulating without symptoms and access site wnl and ECG reviewed                    Follow up with Cardiology Dr. Kaur  in one to two weeks.  Instructed to call and make an appointment

## 2020-01-30 NOTE — DISCHARGE NOTE PROVIDER - NSDCFUADDINST_GEN_ALL_CORE_FT
continue DAPT, BB, Statin.  Instructed to call 911 if chest pain, shortness of breath or bleeding from access site. F/U with cardiologist in 1 week  no heavy lifting > 10lbs x 1 week; no driving x 24 hours; no baths, swimming pools x 1 week; may shower

## 2020-01-30 NOTE — DISCHARGE NOTE NURSING/CASE MANAGEMENT/SOCIAL WORK - NSDCPEEMAIL_GEN_ALL_CORE
[facility] for Tobacco Control email [email or web address]

## 2020-01-30 NOTE — DISCHARGE NOTE PROVIDER - CARE PROVIDER_API CALL
Nash Kaur)  Cardiovascular Disease; Interventional Cardiology  [address] [place] [address]  Phone: [phone]  Fax: [phone]  Follow Up Time:

## 2020-01-30 NOTE — DISCHARGE NOTE NURSING/CASE MANAGEMENT/SOCIAL WORK - NSDCPEWEB_GEN_ALL_CORE
[facility] for Tobacco Control website --- [email or web address] --- [email or web address]

## 2020-01-30 NOTE — DISCHARGE NOTE NURSING/CASE MANAGEMENT/SOCIAL WORK - PATIENT PORTAL LINK FT
You can access the FollowMyHealth Patient Portal offered by [facility] by registering at the following website: [email or web address]. By joining [facility]’s FollowMyHealth portal, you will also be able to view your health information using other applications (apps) compatible with our system.

## 2020-01-30 NOTE — DISCHARGE NOTE PROVIDER - NSDCCPCAREPLAN_GEN_ALL_CORE_FT
PRINCIPAL DISCHARGE DIAGNOSIS  Diagnosis: CAD S/P percutaneous coronary angioplasty  Assessment and Plan of Treatment: s/p PCI YOCASTA

## 2020-01-30 NOTE — DISCHARGE NOTE PROVIDER - NSDCMRMEDTOKEN_GEN_ALL_CORE_FT
Aspirin Enteric Coated 81 mg oral delayed release tablet: 1 tab(s) orally once a day  Brilinta (ticagrelor) 90 mg oral tablet: 1 tab(s) orally 2 times a day MDD:2  losartan 25 mg oral tablet: 1 tab(s) orally once a day  rosuvastatin 10 mg oral tablet: 1 tab(s) orally once a day  Toprol-XL 25 mg oral tablet, extended release: 1 tab(s) orally once a day

## 2020-01-30 NOTE — DISCHARGE NOTE PROVIDER - NSDCCPTREATMENT_GEN_ALL_CORE_FT
PRINCIPAL PROCEDURE  Procedure: Coronary angioplasty  Findings and Treatment: continue with medical regimen      SECONDARY PROCEDURE  Procedure: Percutaneous coronary intervention (PCI) with insertion of stent  Findings and Treatment: continue medical regimen

## 2020-02-11 DIAGNOSIS — I25.118 ATHEROSCLEROTIC HEART DISEASE OF NATIVE CORONARY ARTERY WITH OTHER FORMS OF ANGINA PECTORIS: ICD-10-CM

## 2020-02-11 DIAGNOSIS — L76.32 POSTPROCEDURAL HEMATOMA OF SKIN AND SUBCUTANEOUS TISSUE FOLLOWING OTHER PROCEDURE: ICD-10-CM

## 2020-02-11 DIAGNOSIS — E78.5 HYPERLIPIDEMIA, UNSPECIFIED: ICD-10-CM

## 2020-02-11 DIAGNOSIS — Z90.5 ACQUIRED ABSENCE OF KIDNEY: ICD-10-CM

## 2020-02-11 DIAGNOSIS — I34.1 NONRHEUMATIC MITRAL (VALVE) PROLAPSE: ICD-10-CM

## 2020-02-11 DIAGNOSIS — R07.9 CHEST PAIN, UNSPECIFIED: ICD-10-CM

## 2020-02-11 DIAGNOSIS — I10 ESSENTIAL (PRIMARY) HYPERTENSION: ICD-10-CM
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
## 2020-02-11 DIAGNOSIS — Z79.82 LONG TERM (CURRENT) USE OF ASPIRIN: ICD-10-CM
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
## 2021-07-28 PROBLEM — Z00.00 ENCOUNTER FOR PREVENTIVE HEALTH EXAMINATION: Status: ACTIVE | Noted: 2021-07-28

## 2021-11-18 NOTE — ASU PREOP CHECKLIST - HEIGHT IN INCHES
Cardiology Consultation                                                                    Pt Name: Sigifredo Lei. Age: 69 y.o. Sex: male  : 1954  Location: [facility]    Referring Physician: Michelle Dominguez MD  Primary cardiologist: Dr Zachary Sanchez      Reason for Consult:     Reason for Consultation/Chief Complaint: hypotension    HPI:      Sigifredo Lei. is a 69 y.o. male with a past medical history of CAD s/p LAD stent, HTN, HLP, HFpEF, severe PAD, COPD on baseline 6 L of supplemental oxygen, RLL/RML PE 21 without cor pulmonale, DVT. Echo 21: mild to mod LVH, EF 76%, normal diastolic, normal RV, RVSP 39(8), trace TR. C 2020: patent LAD stent, no stenoses. Patient started having severe right foot pain and was recently assessed with CTA abdominal aorta with bilateral runoff which revealed severe bilateral PAD. Patient was admitted on 11/15 for elective right lower extremity vascular surgery (right EIA to PFA bypass with R EIA stent). He held his anticoagulation couple of days prior to admission. Patient had a successful surgery on 2021 and he was started on heparin drip yesterday evening, switched to Eliquis twice daily this morning. Starting around 1 PM today patient became very hypotensive (BP 80s/50s). Cardiology was consulted for further evaluation. EMR shows oxygen requirements of 8 L however it was verified that 6 L are adequate for his oxygenation. Of note, all of his antihypertensive medications have been given daily since admission along with Lasix 40 mg daily. Patient currently reports no complaints including chest pain, shortness of breath.       Histories     Past Medical History:   has a past medical history of CAD (coronary artery disease), CHF (congestive heart failure) ([facility] [address] COPD (chronic obstructive pulmonary disease) ([facility] [address] Depressive disorder, not elsewhere classified, GERD (gastroesophageal reflux disease), History of colon polyps - 30 seen on colonoscopy 04/2021, History of MI (myocardial infarction), History of skin ulcer of lower extremity, History of transient ischemic attack (TIA), Hyperlipidemia, Hypertension, Neuropathy, KAVITA (obstructive sleep apnea), Personal history of DVT (deep vein thrombosis), PVD (peripheral vascular disease) ([facility] [address] TIA (transient ischemic attack), and Vertebral fracture. Surgical History:   has a past surgical history that includes Appendectomy; Coronary angioplasty with stent (6/2013); other surgical history (Right, 6/25/2013); Leg Debridement (Right, 7/23/2013); skin split graft (Right, 7/25/2013); other surgical history (Left, 8/27/2013); laminectomy (11/18/2015); transluminal angioplasty (Left, 12/22/2015); Total hip arthroplasty (Right, 09/01/2016); Cholecystectomy, laparoscopic; eye surgery (Left); Colonoscopy (4/23/2021); Colonoscopy (4/23/2021); Colonoscopy (4/23/2021); and Femoral Endarterectomy (Right, 11/17/2021). Social History:   reports that he has been smoking cigarettes. He started smoking about 54 years ago. He has a 26.00 pack-year smoking history. He has never used smokeless tobacco. He reports current alcohol use. He reports that he does not use drugs. Family History:  No evidence for sudden cardiac death or premature CAD      Medications:       Home Medications  Were reviewed and are listed in nursing record. and/or listed below  Prior to Admission medications    Medication Sig Start Date End Date Taking?  Authorizing Provider   Cyanocobalamin (B-12) 100 MCG TABS 1 tablet 9/22/21  Yes Historical Provider, MD   doxazosin (CARDURA) 1 MG tablet 1 tablet 8/24/21  Yes Historical Provider, MD   DULoxetine (CYMBALTA) 20 MG extended release capsule 1 capsule 7/30/21  Yes Historical Provider, MD   potassium chloride (MICRO-K) 10 MEQ extended release capsule 1 tablet 9/4/21  Yes Historical Provider, MD   Cholecalciferol (VITAMIN D-3) 25 MCG (1000 UT) CAPS 1 tablet 9/22/21  Yes Historical Provider, MD DAWKINS DVT/PE STARTER PACK 5 MG TBPK tablet  9/20/21  Yes Historical Provider, MD   cilostazol (PLETAL) 50 MG tablet Take 1 tablet by mouth 2 times daily 10/8/21  Yes Manjula Levi DO   colchicine (COLCRYS) 0.6 MG tablet Take 0.6 mg by mouth as needed   Yes Historical Provider, MD   omeprazole (PRILOSEC) 40 MG delayed release capsule  9/4/21  Yes Historical Provider, MD   fluticasone-umeclidin-vilant (TRELEGY ELLIPTA) 100-62.5-25 MCG/INH AEPB Inhale 1 puff into the lungs daily 9/24/21  Yes Manjula Levi DO   isosorbide mononitrate (IMDUR) 30 MG extended release tablet Take 0.5 tablets by mouth daily 9/20/21  Yes Stephanie Lopez MD   folic acid (FOLVITE) 1 MG tablet Take 1 tablet by mouth daily 9/21/21  Yes Stephanie Lopez MD   albuterol sulfate  (90 Base) MCG/ACT inhaler Inhale 2 puffs into the lungs every 6 hours as needed for Wheezing 8/25/21  Yes Manjula Levi DO   allopurinol (ZYLOPRIM) 100 MG tablet Take 1 tablet by mouth daily 8/25/21  Yes Manjula Levi DO   losartan (COZAAR) 100 MG tablet TAKE 1 TABLET BY MOUTH EVERY DAY 8/25/21  Yes Manjula Levi DO   atorvastatin (LIPITOR) 80 MG tablet Take 1 tablet by mouth daily 8/25/21  Yes Manjula Levi DO   nitroGLYCERIN (NITROSTAT) 0.4 MG SL tablet Place 1 tablet under the tongue every 5 minutes as needed for Chest pain 8/25/21  Yes Manjula Levi DO   furosemide (LASIX) 40 MG tablet Take 1 tablet by mouth daily 8/25/21  Yes Manjula Levi DO   metoprolol (LOPRESSOR) 100 MG tablet Take 1 tablet by mouth 2 times daily 8/25/21 11/23/21 Yes Manjula Levi DO   magnesium gluconate (MAGONATE) 500 MG tablet Take 500 mg by mouth daily.    Yes Historical Provider, MD   AMLODIPINE BENZOATE PO 5 mg: TAKE 1 TAB DAILY 9/30/21   Historical Provider, MD   predniSONE (DELTASONE) 10 MG tablet Per instructions: 6 TABLETS 2 TIMES DAILY FOR 2 DAYS 4 TABLETS 2 TIMES DAILY FOR 2 DAYS 2 TABLETS 2 TIMES DAILY FOR 2 DAYS 1 TABLET 2 TIMES DAILY FOR 2 DAYS 9/20/21   Historical Provider, MD nicotine (NICODERM CQ) 21 MG/24HR Place 1 patch onto the skin every 24 hours 9/24/21 9/24/22  Mary Evans DO   pantoprazole (PROTONIX) 40 MG tablet Take 1 tablet by mouth daily for 10 days 9/20/21 9/30/21  Vivien Johnson MD          Inpatient Medications:   apixaban  5 mg Oral BID    metoprolol  25 mg Oral BID    albuterol  2.5 mg Nebulization 4x daily    allopurinol  100 mg Oral Daily    [Held by provider] amLODIPine  5 mg Oral Daily    atorvastatin  80 mg Oral Daily    cilostazol  50 mg Oral BID    DULoxetine  20 mg Oral Daily    furosemide  40 mg Oral Daily    [Held by provider] isosorbide mononitrate  15 mg Oral Daily    [Held by provider] losartan  100 mg Oral Daily    pantoprazole  40 mg Oral QAM AC    potassium chloride  10 mEq Oral Daily    [Held by provider] doxazosin  1 mg Oral Daily    sodium chloride flush  10 mL IntraVENous 2 times per day    tiotropium-olodaterol  2 puff Inhalation Daily    And    budesonide  0.25 mg Nebulization BID       IV drips:   DOPamine      sodium chloride         PRN:  oxyCODONE **OR** oxyCODONE, HYDROmorphone **OR** HYDROmorphone, sodium chloride flush, sodium chloride, ondansetron **OR** ondansetron, acetaminophen    Allergy:     Asa [aspirin] and Daliresp [roflumilast]       Review of Systems:     All 12 point review of symptoms completed. Pertinent positives identified in the HPI, all other review of symptoms negative as below. CONSTITUTIONAL: No fatigue  SKIN: No rash or pruritis. EYES: No visual changes or diplopia. No scleral icterus. ENT: No Headaches, hearing loss or vertigo. No mouth sores or sore throat. CARDIOVASCULAR: No chest pain/chest pressure/chest discomfort. No palpitations. No edema. RESPIRATORY: No dyspnea. No cough or wheezing, no sputum production. GASTROINTESTINAL: No N/V/D. No abdominal pain, appetite loss, blood in stools. GENITOURINARY: No dysuria, trouble voiding, or hematuria.   MUSCULOSKELETAL:  No gait disturbance, weakness or joint complaints. NEUROLOGICAL: No headache, diplopia, change in muscle strength, numbness or tingling. No change in gait, balance, coordination, mood, affect, memory, mentation, behavior. ENDOCRINE: No excessive thirst, fluid intake, or urination. No tremor. HEMATOLOGIC: No abnormal bruising or bleeding. ALLERGY: No nasal congestion or hives. Physical Examination:     Vitals:    11/18/21 1606 11/18/21 1615 11/18/21 1630 11/18/21 1645   BP:  (!) 84/46 (!) 94/54 (!) 100/55   Pulse:  91 85 94   Resp:    19   Temp:       TempSrc:       SpO2: 94% 95% (!) 89% (!) 78%   Weight:       Height:           Wt Readings from Last 3 Encounters:   11/18/21 204 lb 9.4 oz (92.8 kg)   11/04/21 209 lb (94.8 kg)   10/30/21 210 lb (95.3 kg)         General Appearance:  Alert, cooperative, no distress, appears stated age Appropriate weight   Head:  Normocephalic, without obvious abnormality, atraumatic   Eyes:  PERRL, conjunctiva/corneas clear EOM intact  Ears normal   Throat no lesions       Nose: Nares normal, no drainage or sinus tenderness   Throat: Lips, mucosa, and tongue normal   Neck: Supple, symmetrical, trachea midline, no adenopathy, thyroid: not enlarged, symmetric, no tenderness/mass/nodules, no carotid bruit. Lungs:   Respirations unlabored, decreased breath sounds bilaterally with mild wheezing. Chest Wall:  No tenderness or deformity   Heart:  Regular rhythm, rate is controlled, S1, S2 normal, there is no murmur, there is no rub or gallop, cannot assess jvd, no bilateral lower extremity edema   Abdomen:   Soft, non-tender, bowel sounds active all four quadrants,  no masses, no organomegaly       Extremities: Extremities normal, atraumatic, no cyanosis.     Pulses: 2+ and symmetric   Skin: Skin color, texture, turgor normal, no rashes or lesions   Pysch: Normal mood and affect   Neurologic: Normal gross motor and sensory exam.  Cranial nerves intact        Labs:     Recent Labs 11/15/21  1818 11/16/21  0551 11/17/21  0540 11/17/21  1538 11/18/21  0330   * 138 136  --  135*   K 4.2 4.0 4.3  --  4.0   BUN 11 10 14  --  11   CREATININE 0.8 0.9 0.9 0.9 0.8    102 101  --  101   CO2 22 23 23  --  24   GLUCOSE 92 96 100*  --  107*   CALCIUM 8.8 8.7 8.9  --  8.8   MG 1.50* 1.50* 1.60*  --  1.60*     Recent Labs     11/15/21  1818 11/15/21  1818 11/16/21  0551 11/16/21  0551 11/17/21  0540 11/17/21  0540 11/18/21  0330 11/18/21  0330 11/18/21  1415   WBC 8.1  --  7.1  --  7.7  --  8.4  --  10.2   HGB 13.5  --  13.2*  --  13.4*  --  12.7*  --  11.9*   HCT 39.6*  --  38.9*  --  38.7*  --  37.4*  --  35.0*   *  --  130*  --  129*  --  127*  --  140   .7*   < > 105.5*   < > 104.0*   < > 102.5*   < > 102.6*    < > = values in this interval not displayed. No results for input(s): CHOLTOT, TRIG, HDL in the last 72 hours. Invalid input(s): LIPIDCOMM, CHOLHDL, VLDCHOL, LDL  Recent Labs     11/15/21  1818 11/17/21  1840 11/18/21  0613   INR 1.13* 1.08 1.07     Recent Labs     11/18/21  1653   TROPONINI 0.02*     No results for input(s): BNP in the last 72 hours. No results for input(s): TSH in the last 72 hours. No results for input(s): CHOL, HDL, LDLCALC, TRIG in the last 72 hours.]    Lab Results   Component Value Date    CKTOTAL 66 10/30/2021    CKMB 0.7 08/22/2013    TROPONINI 0.02 (H) 11/18/2021         Imaging:     Telemetry personally reviewed:  NSR      Assessment / Plan:     1. Hypotension: It could be due to hypovolemia versus acute bleeding (recent surgery and recent reinitiation of anticoagulation) versus additional acute PE (patient has been off anticoagulation for a few days). There is no indication to suggest infection and septic shock. ECG shows NSR, no evidence of ischemia, troponin 0.02; no evidence of ACS.     -We will check stat H&H  -If acute bleeding, hold anticoagulation  -Would recommend giving up to 1 L of normal saline bolus over a couple of hours.  If no significant improvement with BP, I would recommend starting dopamine low-dose.  -We will repeat a limited echocardiogram in the morning to assess LV and RV size and function  -Follow-up with CBC in the morning  -Hold all antihypertensive medications until course is clarified    2. CAD status post stent:  There is no evidence of ACS this admission.    -Once stable, will consider baby aspirin, beta-blocker, statin    3. Essential hypertension:  BP is low. We will hold antihypertensive medications. Due to the high probability of clinically significant life threating deterioration of the patient's condition that required my urgent intervention, a total critical care time 35 minutes was used. This time excludes any time that may have been spent performing procedures. This includes but not limited to vital sign monitoring, telemetry monitoring, continuous pulse oximety, IV medication, clinical response to the IV medications, documentation time, consultation time, interpretation of lab data, review of nursing notes and old record review. I have personally reviewed the reports and images of labs, radiological studies, cardiac studies including ECG's and telemetry, current and old medical records. The note was completed using EMR and Dragon dictation system. Every effort was made to ensure accuracy; however, inadvertent computerized transcription errors may be present. All questions and concerns were addressed to the patient/family. Alternatives to my treatment were discussed. I would like to thank you for providing me the opportunity to participate in the care of your patient. If you have any questions, please do not hesitate to contact me.      Evangelista Rollins MD, [facility] [place], [address]  The [address]  [address] [place] [address] [place] [address]  Ph: [phone]  Fax: [phone]

## 2021-12-13 ENCOUNTER — NON-APPOINTMENT (OUTPATIENT)
Age: 66
End: 2021-12-13

## 2021-12-13 ENCOUNTER — APPOINTMENT (OUTPATIENT)
Age: 66
End: 2021-12-13
Payer: MEDICARE

## 2021-12-13 VITALS
RESPIRATION RATE: 12 BRPM | HEIGHT: 70 IN | DIASTOLIC BLOOD PRESSURE: 98 MMHG | HEART RATE: 71 BPM | OXYGEN SATURATION: 98 % | WEIGHT: 209 LBS | BODY MASS INDEX: 29.92 KG/M2 | SYSTOLIC BLOOD PRESSURE: 142 MMHG

## 2021-12-13 DIAGNOSIS — J30.1 ALLERGIC RHINITIS DUE TO POLLEN: ICD-10-CM

## 2021-12-13 PROCEDURE — 99214 OFFICE O/P EST MOD 30 MIN: CPT

## 2021-12-13 NOTE — ASSESSMENT
[FreeTextEntry1] : A:\par [place]\par Obesity\par \par PLAN:\par The patient is benefitting from the PAP device .\par New supplies ordered \par Weight loss discussed\par I stressed the need maintain compliance  with the PAP device.\par The patient is not to use an Ozone or UV sterilizer. 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 The patient has registered the PAP device.\par I discussed at length with the pt the pros and cons to continuing PAP device given the health risks compared to stopping the device. There can be extreme risks to stopping the PAP.\par The patient is not to use an Ozone or UV sterilizer.\par The pt will call the insurance then vendor to see next steps in obtaining a replacement PAP device.\par I will forward a Rx to assist with the transition to new PAP device.\par I discussed the proposed solutions from [employer] regarding the CPAP replacement.\par

## 2022-04-05 ENCOUNTER — OUTPATIENT (OUTPATIENT)
Dept: OUTPATIENT SERVICES | Facility: HOSPITAL | Age: 67
LOS: 1 days | Discharge: HOME | End: 2022-04-05

## 2022-04-05 VITALS — HEART RATE: 72 BPM | HEIGHT: 70 IN | WEIGHT: 210.1 LBS | OXYGEN SATURATION: 100 %

## 2022-04-05 DIAGNOSIS — Z90.5 ACQUIRED ABSENCE OF KIDNEY: Chronic | ICD-10-CM

## 2022-04-05 LAB
ANION GAP SERPL CALC-SCNC: 11 MMOL/L — SIGNIFICANT CHANGE UP (ref 7–14)
BUN SERPL-MCNC: 20 MG/DL — SIGNIFICANT CHANGE UP (ref 10–20)
CALCIUM SERPL-MCNC: 9.9 MG/DL — SIGNIFICANT CHANGE UP (ref 8.5–10.1)
CHLORIDE SERPL-SCNC: 106 MMOL/L — SIGNIFICANT CHANGE UP (ref 98–110)
CO2 SERPL-SCNC: 26 MMOL/L — SIGNIFICANT CHANGE UP (ref 17–32)
CREAT SERPL-MCNC: 1 MG/DL — SIGNIFICANT CHANGE UP (ref 0.7–1.5)
EGFR: 82 ML/MIN/1.73M2 — SIGNIFICANT CHANGE UP
GLUCOSE SERPL-MCNC: 104 MG/DL — HIGH (ref 70–99)
HCT VFR BLD CALC: 39.8 % — LOW (ref 42–52)
HGB BLD-MCNC: 13.3 G/DL — LOW (ref 14–18)
MCHC RBC-ENTMCNC: 29 PG — SIGNIFICANT CHANGE UP (ref 27–31)
MCHC RBC-ENTMCNC: 33.4 G/DL — SIGNIFICANT CHANGE UP (ref 32–37)
MCV RBC AUTO: 86.7 FL — SIGNIFICANT CHANGE UP (ref 80–94)
NRBC # BLD: 0 /100 WBCS — SIGNIFICANT CHANGE UP (ref 0–0)
PLATELET # BLD AUTO: 192 K/UL — SIGNIFICANT CHANGE UP (ref 130–400)
POTASSIUM SERPL-MCNC: 4.3 MMOL/L — SIGNIFICANT CHANGE UP (ref 3.5–5)
POTASSIUM SERPL-SCNC: 4.3 MMOL/L — SIGNIFICANT CHANGE UP (ref 3.5–5)
RBC # BLD: 4.59 M/UL — LOW (ref 4.7–6.1)
RBC # FLD: 13.5 % — SIGNIFICANT CHANGE UP (ref 11.5–14.5)
SODIUM SERPL-SCNC: 143 MMOL/L — SIGNIFICANT CHANGE UP (ref 135–146)
WBC # BLD: 5.45 K/UL — SIGNIFICANT CHANGE UP (ref 4.8–10.8)
WBC # FLD AUTO: 5.45 K/UL — SIGNIFICANT CHANGE UP (ref 4.8–10.8)

## 2022-04-05 NOTE — H&P CARDIOLOGY - HISTORY OF PRESENT ILLNESS
65 y/o male w/ PMHx: HTN, HLD, MVP, GERD< CAD s/p YOCASTA x2 pLAD (1/2020) & YOCASTA x3 RCA (4/2018), + family h/o CAD in father PSHx: partial nephrectomy presents here today for C d/t c/o CP and + stress echo 2/7 with CP at peak exercise and upsloping 0.8 ST depressions in inferior and lateral leads.     Pre cath note:    indication:  [ ] STEMI                [ ] NSTEMI                 [ ] Acute coronary syndrome                     [ ]Unstable Angina   [ ] high risk  [ ] intermediate risk  [ ] low risk                     [ ] Stable Angina     non-invasive testing:      STRESS ECHO                    Date:         2/7            result: [x ] high risk  [ ] intermediate risk  [ ] low risk    Anti- Anginal medications:                    [ ] not used                       [ ] used                   [ ] not used but strong indication not to use    Ejection Fraction                   [ ] <29            [ ] 30-39%   [ ] 40-49%     [x ]>50%    CHF                   [ ] active (within last 14 days on meds   [ ] Chronic (on meds but no exacerbation)    COPD                   [ ] mild (on chronic bronchodilators)  [ ] moderate (on chronic steroid therapy)      [ ] severe (indication for home O2 or PACO2 >50)    Other risk factors:                       [ ] Previous MI                     [ ] CVA/ stroke                    [ ] carotid stent/ CEA                    [ ] PVD/PAD- (arterial aneurysm, non-palpable pulses, tortuous vessel with inability to insert catheter, infra-renal dissection, renal or subclavian artery stenosis)                    [ ] diabetic                    [ ] previous CABG                    [ ] Renal Failure       RIGHT RADIAL ARTERY EVALUATION:  MARBELLA TEST: WNL    Cath Bleeding Risk: 0.6%

## 2022-04-05 NOTE — ASU PATIENT PROFILE, ADULT - FALL HARM RISK - UNIVERSAL INTERVENTIONS
Bed in lowest position, wheels locked, appropriate side rails in place/Call bell, personal items and telephone in reach/Instruct patient to call for assistance before getting out of bed or chair/Non-slip footwear when patient is out of bed/[place] to call system/Physically safe environment - no spills, clutter or unnecessary equipment/Purposeful Proactive Rounding/Room/bathroom lighting operational, light cord in reach

## 2022-04-05 NOTE — CHART NOTE - NSCHARTNOTEFT_GEN_A_CORE
Cardiac Cath Report:    Pre- procedure diagnosis: Stable angina, abnormal stress echo  Post procedure diagnosis: Non-obstructive CAD    PROCEDURE:     Left Heart Catheterization     Coronary Angiogram    Primary Physician: Dr Kaur    Cardiac Fellow:  Dr Rooney    ANESTHESIA TYPE: Sedation/Local    ESTIMATED BLOOD LOSS:  Less than 10 cc    Access: right radial artery     CONDITION: Good    FINDINGS OF PROCEDURE:  The coronary circulation is right dominant. There was no angiographic evidence for occlusive coronary artery disease. Left main: Angiography showed minor luminal irregularities with no flow limiting lesions. LAD: Angiography showed mild atherosclerosis with no flow limiting lesions. Patent prior stents. 1st diagonal: There was a discrete 40 % stenosis at the ostium of the vessel segment. Proximal circumflex: Angiography showed mild atherosclerosis with no flow limiting lesions. Distal circumflex: Angiography showed minor luminal irregularities with no flow limiting lesions. 1st obtuse marginal: The vessel was large sized. There was a discrete 60 % stenosis at the ostium of the vessel segment. RCA: Angiography showed minor luminal irregularities with no flow limiting lesions. Right PDA: The vessel was large sized. Angiography showed minor luminal irregularities with no flow limiting lesions. Right posterolateral segment: The vessel was small sized.     Contrast used: 70    Sheath removed  Hemostasis was achieved with TR band  Transferred to hold area in hemodynamically stable condition     Complications:  None    Impression  Non-obstructive CAD    Plan:  Return to In-patient bed  Continue medical therapy  Risk factor modification   GDMT

## 2022-04-05 NOTE — ASU PATIENT PROFILE, ADULT - NSTOBACCONEVERSMOKERY/N_GEN_A
Yes [Feeling Poorly] : not feeling poorly (malaise) [Fever] : no fever [Wgt Loss (___ Lbs)] : no recent weight loss [Pallor] : not pale [Eye Discharge] : no eye discharge [Redness] : no redness [Change in Vision] : no change in vision [Nasal Stuffiness] : no nasal congestion [Sore Throat] : no sore throat [Earache] : no earache [Loss Of Hearing] : no hearing loss [Cyanosis] : no cyanosis [Edema] : no edema [Diaphoresis] : not diaphoretic [Exercise Intolerance] : no persistence of exercise intolerance [Palpitations] : no palpitations [Orthopnea] : no orthopnea [Fast HR] : no tachycardia [Tachypnea] : not tachypneic [Wheezing] : no wheezing [Cough] : no cough [Shortness Of Breath] : not expressed as feeling short of breath [Vomiting] : no vomiting [Diarrhea] : no diarrhea [Abdominal Pain] : no abdominal pain [Decrease In Appetite] : appetite not decreased [Fainting (Syncope)] : no fainting [Seizure] : no seizures [Headache] : no headache [Dizziness] : no dizziness [Limping] : no limping [Joint Pains] : no arthralgias [Joint Swelling] : no joint swelling [Rash] : no rash [Wound problems] : no wound problems [Easy Bruising] : no tendency for easy bruising [Swollen Glands] : no lymphadenopathy [Easy Bleeding] : no ~M tendency for easy bleeding [Nosebleeds] : no epistaxis [Sleep Disturbances] : ~T no sleep disturbances [Hyperactive] : no hyperactive behavior [Depression] : no depression [Anxiety] : no anxiety [Failure To Thrive] : no failure to thrive [Short Stature] : short stature was not noted [Jitteriness] : no jitteriness [Heat/Cold Intolerance] : no temperature intolerance [Dec Urine Output] : no oliguria [FreeTextEntry1] : SOB on exertion and increased fatigue

## 2022-04-05 NOTE — H&P CARDIOLOGY - NSICDXPASTMEDICALHX_GEN_ALL_CORE_FT
PAST MEDICAL HISTORY:  Hyperlipidemia, unspecified hyperlipidemia type     Mitral valve prolapse     Rheumatic fever

## 2022-04-05 NOTE — H&P CARDIOLOGY - NSICDXFAMILYHX_GEN_ALL_CORE_FT
FAMILY HISTORY:  Father  Still living? No  Family history of early CAD, Age at diagnosis: Age Unknown

## 2022-04-08 DIAGNOSIS — R94.39 ABNORMAL RESULT OF OTHER CARDIOVASCULAR FUNCTION STUDY: ICD-10-CM

## 2022-04-08 DIAGNOSIS — Z86.79 PERSONAL HISTORY OF OTHER DISEASES OF THE CIRCULATORY SYSTEM: ICD-10-CM

## 2022-04-08 DIAGNOSIS — Z82.49 FAMILY HISTORY OF ISCHEMIC HEART DISEASE AND OTHER DISEASES OF THE CIRCULATORY SYSTEM: ICD-10-CM

## 2022-04-08 DIAGNOSIS — K21.9 GASTRO-ESOPHAGEAL REFLUX DISEASE WITHOUT ESOPHAGITIS: ICD-10-CM

## 2022-04-08 DIAGNOSIS — I25.10 ATHEROSCLEROTIC HEART DISEASE OF NATIVE CORONARY ARTERY WITHOUT ANGINA PECTORIS: ICD-10-CM

## 2022-04-08 DIAGNOSIS — Z79.82 LONG TERM (CURRENT) USE OF ASPIRIN: ICD-10-CM

## 2022-04-08 DIAGNOSIS — I10 ESSENTIAL (PRIMARY) HYPERTENSION: ICD-10-CM

## 2022-04-08 DIAGNOSIS — E78.00 PURE HYPERCHOLESTEROLEMIA, UNSPECIFIED: ICD-10-CM

## 2022-10-17 ENCOUNTER — APPOINTMENT (OUTPATIENT)
Age: 67
End: 2022-10-17

## 2022-10-17 VITALS
SYSTOLIC BLOOD PRESSURE: 140 MMHG | BODY MASS INDEX: 30.06 KG/M2 | DIASTOLIC BLOOD PRESSURE: 80 MMHG | HEART RATE: 66 BPM | HEIGHT: 70 IN | WEIGHT: 210 LBS | OXYGEN SATURATION: 97 % | RESPIRATION RATE: 12 BRPM

## 2022-10-17 DIAGNOSIS — J84.89 OTHER SPECIFIED INTERSTITIAL PULMONARY DISEASES: ICD-10-CM

## 2022-10-17 DIAGNOSIS — D71 OTHER SPECIFIED INTERSTITIAL PULMONARY DISEASES: ICD-10-CM

## 2022-10-17 PROCEDURE — 99214 OFFICE O/P EST MOD 30 MIN: CPT

## 2022-10-17 NOTE — ASSESSMENT
[FreeTextEntry1] : A:\par [place]\par Obesity\par Dyspnea\par Granulomatous disease\par \par PLAN:\par The patient is benefitting from the PAP device . He received a replacement recall device \par New supplies ordered \par Weight loss discussed\par I stressed the need maintain compliance  with the PAP device.\par The patient is not to use an Ozone or UV sterilizer. 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

## 2022-10-17 NOTE — REASON FOR VISIT
[Follow-Up] : a follow-up visit [Sleep Apnea] : sleep apnea [Shortness of Breath] : shortness of breath [Obesity] : obesity [TextBox_44] : Having some elements of dyspnea on exertion.  Had a recent cardiac work-up that was negative.  Needs further pulmonary function testing\par \par Reviewed latest CAT scan shows signs of prior granulomatous currently he does not want bronchoscopy disease possible BLANE.  And aggressive management.  We will make decisions after the PFT.

## 2022-10-31 ENCOUNTER — OUTPATIENT (OUTPATIENT)
Dept: OUTPATIENT SERVICES | Facility: HOSPITAL | Age: 67
LOS: 1 days | Discharge: HOME | End: 2022-10-31

## 2022-10-31 DIAGNOSIS — Z90.5 ACQUIRED ABSENCE OF KIDNEY: Chronic | ICD-10-CM

## 2022-10-31 DIAGNOSIS — R06.02 SHORTNESS OF BREATH: ICD-10-CM

## 2022-10-31 PROCEDURE — 94060 EVALUATION OF WHEEZING: CPT | Mod: 26

## 2022-10-31 PROCEDURE — 94727 GAS DIL/WSHOT DETER LNG VOL: CPT | Mod: 26

## 2022-10-31 PROCEDURE — 94729 DIFFUSING CAPACITY: CPT | Mod: 26

## 2022-11-14 ENCOUNTER — TRANSCRIPTION ENCOUNTER (OUTPATIENT)
Age: 67
End: 2022-11-14

## 2022-11-23 ENCOUNTER — APPOINTMENT (OUTPATIENT)
Dept: PULMONOLOGY | Facility: CLINIC | Age: 67
End: 2022-11-23

## 2022-12-21 ENCOUNTER — APPOINTMENT (OUTPATIENT)
Dept: PULMONOLOGY | Facility: CLINIC | Age: 67
End: 2022-12-21

## 2022-12-21 VITALS
HEART RATE: 62 BPM | DIASTOLIC BLOOD PRESSURE: 80 MMHG | BODY MASS INDEX: 29.63 KG/M2 | OXYGEN SATURATION: 96 % | SYSTOLIC BLOOD PRESSURE: 140 MMHG | HEIGHT: 70 IN | WEIGHT: 207 LBS

## 2022-12-21 DIAGNOSIS — R06.09 OTHER FORMS OF DYSPNEA: ICD-10-CM

## 2022-12-21 DIAGNOSIS — E66.9 OBESITY, UNSPECIFIED: ICD-10-CM

## 2022-12-21 PROCEDURE — 99213 OFFICE O/P EST LOW 20 MIN: CPT

## 2022-12-21 NOTE — ASSESSMENT
[FreeTextEntry1] : A:\par [place]\par Obesity\par Dyspnea\par Granulomatous disease\par \par PLAN:\par The patient is benefitting from the PAP device . He received a replacement recall device \par New supplies ordered \par Weight loss discussed\par I stressed the need maintain compliance  with the PAP device.\par The patient is not to use an Ozone or UV sterilizer. 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

## 2022-12-21 NOTE — REASON FOR VISIT
[Follow-Up] : a follow-up visit [Sleep Apnea] : sleep apnea [Wheezing] : wheezing [TextBox_44] : doing well in general

## 2023-01-19 ENCOUNTER — OUTPATIENT (OUTPATIENT)
Dept: OUTPATIENT SERVICES | Facility: HOSPITAL | Age: 68
LOS: 1 days | Discharge: HOME | End: 2023-01-19
Payer: MEDICARE

## 2023-01-19 DIAGNOSIS — Z90.5 ACQUIRED ABSENCE OF KIDNEY: Chronic | ICD-10-CM

## 2023-01-19 DIAGNOSIS — R13.10 DYSPHAGIA, UNSPECIFIED: ICD-10-CM

## 2023-01-19 PROCEDURE — 74220 X-RAY XM ESOPHAGUS 1CNTRST: CPT | Mod: 26

## 2023-07-19 VITALS
SYSTOLIC BLOOD PRESSURE: 127 MMHG | DIASTOLIC BLOOD PRESSURE: 76 MMHG | HEIGHT: 70 IN | RESPIRATION RATE: 16 BRPM | OXYGEN SATURATION: 99 % | WEIGHT: 199.08 LBS | HEART RATE: 63 BPM

## 2023-07-19 NOTE — H&P CARDIOLOGY - NSICDXPASTMEDICALHX_GEN_ALL_CORE_FT
PAST MEDICAL HISTORY:  CAD S/P percutaneous coronary angioplasty     GERD (gastroesophageal reflux disease)     Hyperlipidemia, unspecified hyperlipidemia type     Mitral valve prolapse     Rheumatic fever      PAST MEDICAL HISTORY:  CAD S/P percutaneous coronary angioplasty     GERD (gastroesophageal reflux disease)     Hyperlipidemia, unspecified hyperlipidemia type     Mitral valve prolapse     [place] on CPAP     Rheumatic fever

## 2023-07-19 NOTE — H&P CARDIOLOGY - HISTORY OF PRESENT ILLNESS
Patient is a 67y Male who has PMH of MVP, HLD, rheumatic fever, HTN, GERD, CAD s/p YOCASTA x2 pLAD (1/2020) & YOCASTA x3 RCA (4/2018),  Surgical history: partial nephrectomy  Family history: CAD-father   Patient has been having symptoms of                    presents to the cardiology department for DAVIAN/RHC/LHC with possible intervention.     Cardiac CAth 5/4/22  FINDINGS OF PROCEDURE:  The coronary circulation is right dominant. There was no angiographic evidence for occlusive coronary artery disease. Left main: Angiography showed minor luminal irregularities with no flow limiting lesions. LAD: Angiography showed mild atherosclerosis with no flow limiting lesions. Patent prior stents. 1st diagonal: There was a discrete 40 % stenosis at the ostium of the vessel segment. Proximal circumflex: Angiography showed mild atherosclerosis with no flow limiting lesions. Distal circumflex: Angiography showed minor luminal irregularities with no flow limiting lesions. 1st obtuse marginal: The vessel was large sized. There was a discrete 60 % stenosis at the ostium of the vessel segment. RCA: Angiography showed minor luminal irregularities with no flow limiting lesions. Right PDA: The vessel was large sized. Angiography showed minor luminal irregularities with no flow limiting lesions. Right posterolateral segment: The vessel was small sized.     Pre cath note:  indication:  [ ] STEMI                [ ] NSTEMI                 [ ] Acute coronary syndrome                   [ ]Unstable Angina   [ ] high risk  [ ] intermediate risk  [ ] low risk                   [ ] Stable Angina     non-invasive testing:                          Date:                     result: [ ] high risk  [ ] intermediate risk  [ ] low risk    Anti- Anginal medications:                    [ ] not used                       [ ] used                   [ ] not used but strong indication not to use    Ejection Fraction                   [ ] <29            [ ] 30-39%   [ ] 40-49%     [ ]>50%    CHF                   [ ] active (within last 14 days on meds   [ ] Chronic (on meds but no exacerbation)    COPD                   [ ] mild (on chronic bronchodilators)  [ ] moderate (on chronic steroid therapy)      [ ] severe (indication for home O2 or PACO2 >50)    Other risk factors:                     [ ] Previous MI                     [ ] CVA/ stroke                    [ ] carotid stent/ CEA                    [ ] PVD/PAD- (arterial aneurysm, non-palpable pulses, tortuous vessel with inability to insert catheter, infra-renal dissection, renal or subclavian artery stenosis)                    [ ] diabetic                    [ ] previous CABG                    [ ] Renal Failure     Bleeding Risk:         RIGHT RADIAL ARTERY EVALUATION:  MARBELLA TEST: [] Negative          [] Positive  BARBEAU TEST: [] Class A           [] Class B           [] Class C            [] Class D      EF:     EKG:     Fluids:  Patient is a 67y Male who has PMH of MVP, HLD, rheumatic fever, HTN, GERD, CAD s/p YOCASTA x2 pLAD (1/2020) & YOCASTA x3 RCA (4/2018),  Surgical history: partial nephrectomy  Family history: CAD-father   Patient here for DAVIAN    Cardiac CAth 5/4/22  FINDINGS OF PROCEDURE:  The coronary circulation is right dominant. There was no angiographic evidence for occlusive coronary artery disease. Left main: Angiography showed minor luminal irregularities with no flow limiting lesions. LAD: Angiography showed mild atherosclerosis with no flow limiting lesions. Patent prior stents. 1st diagonal: There was a discrete 40 % stenosis at the ostium of the vessel segment. Proximal circumflex: Angiography showed mild atherosclerosis with no flow limiting lesions. Distal circumflex: Angiography showed minor luminal irregularities with no flow limiting lesions. 1st obtuse marginal: The vessel was large sized. There was a discrete 60 % stenosis at the ostium of the vessel segment. RCA: Angiography showed minor luminal irregularities with no flow limiting lesions. Right PDA: The vessel was large sized. Angiography showed minor luminal irregularities with no flow limiting lesions. Right posterolateral segment: The vessel was small sized.      Patient is a 67y Male with PMH of MVP, HLD, rheumatic fever, HTN, GERD, [place] on CPAP @ night, CAD s/p YOCASTA x2 pLAD (1/2020) & YOCASTA x3 RCA (4/2018), with diagnostic cardiac cath on 4/5/22 with patent stent sites, who presented to his cardiologist with c/o SOB on minimal exertion, that mainly occurs after eating.  Pt underwent DAVIAN today which revealed severe AS and moderate to severe MR (see below).  Pt is now referred for R and L cardiac cath with possible intervention if clinically indicated.    DAVIAN 7/20/23:  Preliminary Findings:  LA: Mildly enlarged  IDA: Left atrial appendage was clear of clot and smoke. Normal doppler velocities   LV: LVEF was estimated at 55-65%  MV: mod to severe MR with posteriorly restricted leaflet and calcification, No MS  AV: mild AI, severe AS, Calcified valve  RA: Mildly enlarged  RV: Normal size and function  TV: No TR  PV: No SD, no PS  IAS: No PFO or ASD. No R-> L shunt   Aorta: There was mild, non-mobile atheroma seen in the thoracic aorta.    Cardiac Cath 4/5/22:  FINDINGS OF PROCEDURE:  The coronary circulation is right dominant. There was no angiographic evidence for occlusive coronary artery disease. Left main: Angiography showed minor luminal irregularities with no flow limiting lesions. LAD: Angiography showed mild atherosclerosis with no flow limiting lesions. Patent prior stents. 1st diagonal: There was a discrete 40 % stenosis at the ostium of the vessel segment. Proximal circumflex: Angiography showed mild atherosclerosis with no flow limiting lesions. Distal circumflex: Angiography showed minor luminal irregularities with no flow limiting lesions. 1st obtuse marginal: The vessel was large sized. There was a discrete 60 % stenosis at the ostium of the vessel segment. RCA: Angiography showed minor luminal irregularities with no flow limiting lesions. Right PDA: The vessel was large sized. Angiography showed minor luminal irregularities with no flow limiting lesions. Right posterolateral segment: The vessel was small sized.     Pre cath note:    indication:  [ ] STEMI                [ ] NSTEMI                 [ ] Acute coronary syndrome                     [x ]Unstable Angina   [ ] high risk  [x ] intermediate risk  [ ] low risk                     [ ] Stable Angina     non-invasive testing:                          Date:                     result: [ ] high risk  [ ] intermediate risk  [ ] low risk    Anti- Anginal medications:                    [ ] not used                       [x ] used                   [ ] not used but strong indication not to use  -on BB    Ejection Fraction                   [ ] <29            [ ] 30-39%   [ ] 40-49%     [ ]>50%    CHF                   [ ] active (within last 14 days on meds   [ ] Chronic (on meds but no exacerbation)    COPD                   [ ] mild (on chronic bronchodilators)  [ ] moderate (on chronic steroid therapy)      [ ] severe (indication for home O2 or PACO2 >50)    Other risk factors:                       [ ] Previous MI                     [ ] CVA/ stroke                    [ ] carotid stent/ CEA                    [ ] PVD/PAD- (arterial aneurysm, non-palpable pulses, tortuous vessel with inability to insert catheter, infra-renal dissection, renal or subclavian artery stenosis)                    [ ] diabetic                    [ ] previous CABG                    [ ] Renal Failure               Right Rafael Test: positive    Adjusted Cath Bleeding Risk:  1.2%    Pre-hydration: NS @ 100ml/hr

## 2023-07-19 NOTE — H&P CARDIOLOGY - NSICDXPASTSURGICALHX_GEN_ALL_CORE_FT
PAST SURGICAL HISTORY:  H/O partial nephrectomy      PAST SURGICAL HISTORY:  H/O partial nephrectomy     H/O prostatectomy

## 2023-07-20 ENCOUNTER — INPATIENT (INPATIENT)
Facility: HOSPITAL | Age: 68
LOS: 0 days | Discharge: HOME | DRG: 287 | End: 2023-07-21
Attending: INTERNAL MEDICINE | Admitting: INTERNAL MEDICINE
Payer: MEDICARE

## 2023-07-20 VITALS
DIASTOLIC BLOOD PRESSURE: 71 MMHG | TEMPERATURE: 98 F | RESPIRATION RATE: 18 BRPM | OXYGEN SATURATION: 99 % | SYSTOLIC BLOOD PRESSURE: 108 MMHG | HEART RATE: 64 BPM

## 2023-07-20 DIAGNOSIS — Z87.891 PERSONAL HISTORY OF NICOTINE DEPENDENCE: ICD-10-CM

## 2023-07-20 DIAGNOSIS — Z95.5 PRESENCE OF CORONARY ANGIOPLASTY IMPLANT AND GRAFT: ICD-10-CM

## 2023-07-20 DIAGNOSIS — I25.10 ATHEROSCLEROTIC HEART DISEASE OF NATIVE CORONARY ARTERY WITHOUT ANGINA PECTORIS: ICD-10-CM

## 2023-07-20 DIAGNOSIS — K21.9 GASTRO-ESOPHAGEAL REFLUX DISEASE WITHOUT ESOPHAGITIS: ICD-10-CM

## 2023-07-20 DIAGNOSIS — Z90.79 ACQUIRED ABSENCE OF OTHER GENITAL ORGAN(S): Chronic | ICD-10-CM

## 2023-07-20 DIAGNOSIS — Z79.899 OTHER LONG TERM (CURRENT) DRUG THERAPY: ICD-10-CM

## 2023-07-20 DIAGNOSIS — Z90.79 ACQUIRED ABSENCE OF OTHER GENITAL ORGAN(S): ICD-10-CM

## 2023-07-20 DIAGNOSIS — I25.2 OLD MYOCARDIAL INFARCTION: ICD-10-CM

## 2023-07-20 DIAGNOSIS — E78.5 HYPERLIPIDEMIA, UNSPECIFIED: ICD-10-CM

## 2023-07-20 DIAGNOSIS — I08.0 RHEUMATIC DISORDERS OF BOTH MITRAL AND AORTIC VALVES: ICD-10-CM

## 2023-07-20 DIAGNOSIS — G47.33 OBSTRUCTIVE SLEEP APNEA (ADULT) (PEDIATRIC): ICD-10-CM

## 2023-07-20 DIAGNOSIS — E87.70 FLUID OVERLOAD, UNSPECIFIED: ICD-10-CM

## 2023-07-20 DIAGNOSIS — I10 ESSENTIAL (PRIMARY) HYPERTENSION: ICD-10-CM

## 2023-07-20 DIAGNOSIS — Z79.82 LONG TERM (CURRENT) USE OF ASPIRIN: ICD-10-CM

## 2023-07-20 DIAGNOSIS — Z90.5 ACQUIRED ABSENCE OF KIDNEY: ICD-10-CM

## 2023-07-20 DIAGNOSIS — Z90.5 ACQUIRED ABSENCE OF KIDNEY: Chronic | ICD-10-CM

## 2023-07-20 DIAGNOSIS — I35.0 NONRHEUMATIC AORTIC (VALVE) STENOSIS: ICD-10-CM

## 2023-07-20 LAB
ANION GAP SERPL CALC-SCNC: 9 MMOL/L — SIGNIFICANT CHANGE UP (ref 7–14)
BUN SERPL-MCNC: 26 MG/DL — HIGH (ref 10–20)
CALCIUM SERPL-MCNC: 9.6 MG/DL — SIGNIFICANT CHANGE UP (ref 8.4–10.4)
CHLORIDE SERPL-SCNC: 108 MMOL/L — SIGNIFICANT CHANGE UP (ref 98–110)
CO2 SERPL-SCNC: 23 MMOL/L — SIGNIFICANT CHANGE UP (ref 17–32)
CREAT SERPL-MCNC: 0.9 MG/DL — SIGNIFICANT CHANGE UP (ref 0.7–1.5)
EGFR: 92 ML/MIN/1.73M2 — SIGNIFICANT CHANGE UP
GLUCOSE SERPL-MCNC: 99 MG/DL — SIGNIFICANT CHANGE UP (ref 70–99)
HCT VFR BLD CALC: 35.3 % — LOW (ref 42–52)
HGB BLD-MCNC: 11.5 G/DL — LOW (ref 14–18)
MCHC RBC-ENTMCNC: 28.8 PG — SIGNIFICANT CHANGE UP (ref 27–31)
MCHC RBC-ENTMCNC: 32.6 G/DL — SIGNIFICANT CHANGE UP (ref 32–37)
MCV RBC AUTO: 88.5 FL — SIGNIFICANT CHANGE UP (ref 80–94)
NRBC # BLD: 0 /100 WBCS — SIGNIFICANT CHANGE UP (ref 0–0)
PLATELET # BLD AUTO: 158 K/UL — SIGNIFICANT CHANGE UP (ref 130–400)
PMV BLD: 11.1 FL — HIGH (ref 7.4–10.4)
POTASSIUM SERPL-MCNC: 4.4 MMOL/L — SIGNIFICANT CHANGE UP (ref 3.5–5)
POTASSIUM SERPL-SCNC: 4.4 MMOL/L — SIGNIFICANT CHANGE UP (ref 3.5–5)
RBC # BLD: 3.99 M/UL — LOW (ref 4.7–6.1)
RBC # FLD: 13.9 % — SIGNIFICANT CHANGE UP (ref 11.5–14.5)
SODIUM SERPL-SCNC: 140 MMOL/L — SIGNIFICANT CHANGE UP (ref 135–146)
WBC # BLD: 5.48 K/UL — SIGNIFICANT CHANGE UP (ref 4.8–10.8)
WBC # FLD AUTO: 5.48 K/UL — SIGNIFICANT CHANGE UP (ref 4.8–10.8)

## 2023-07-20 PROCEDURE — 85027 COMPLETE CBC AUTOMATED: CPT

## 2023-07-20 PROCEDURE — 93005 ELECTROCARDIOGRAM TRACING: CPT

## 2023-07-20 PROCEDURE — 83735 ASSAY OF MAGNESIUM: CPT

## 2023-07-20 PROCEDURE — 36415 COLL VENOUS BLD VENIPUNCTURE: CPT

## 2023-07-20 PROCEDURE — 80048 BASIC METABOLIC PNL TOTAL CA: CPT

## 2023-07-20 PROCEDURE — 93456 R HRT CORONARY ARTERY ANGIO: CPT

## 2023-07-20 RX ORDER — SILODOSIN 4 MG/1
1 CAPSULE ORAL
Qty: 0 | Refills: 0 | DISCHARGE

## 2023-07-20 RX ORDER — LOSARTAN POTASSIUM 100 MG/1
1 TABLET, FILM COATED ORAL
Qty: 0 | Refills: 0 | DISCHARGE

## 2023-07-20 RX ORDER — FUROSEMIDE 40 MG
40 TABLET ORAL ONCE
Refills: 0 | Status: COMPLETED | OUTPATIENT
Start: 2023-07-20 | End: 2023-07-20

## 2023-07-20 RX ORDER — ASPIRIN/CALCIUM CARB/MAGNESIUM 324 MG
81 TABLET ORAL DAILY
Refills: 0 | Status: DISCONTINUED | OUTPATIENT
Start: 2023-07-21 | End: 2023-07-21

## 2023-07-20 RX ORDER — ROSUVASTATIN CALCIUM 5 MG/1
1 TABLET ORAL
Qty: 0 | Refills: 0 | DISCHARGE

## 2023-07-20 RX ORDER — PANTOPRAZOLE SODIUM 20 MG/1
1 TABLET, DELAYED RELEASE ORAL
Qty: 0 | Refills: 0 | DISCHARGE

## 2023-07-20 RX ORDER — FAMOTIDINE 10 MG/ML
1 INJECTION INTRAVENOUS
Qty: 0 | Refills: 0 | DISCHARGE

## 2023-07-20 RX ORDER — LOSARTAN POTASSIUM 100 MG/1
50 TABLET, FILM COATED ORAL DAILY
Refills: 0 | Status: DISCONTINUED | OUTPATIENT
Start: 2023-07-21 | End: 2023-07-21

## 2023-07-20 RX ORDER — METOPROLOL TARTRATE 50 MG
25 TABLET ORAL DAILY
Refills: 0 | Status: DISCONTINUED | OUTPATIENT
Start: 2023-07-21 | End: 2023-07-21

## 2023-07-20 RX ORDER — ASPIRIN/CALCIUM CARB/MAGNESIUM 324 MG
1 TABLET ORAL
Qty: 0 | Refills: 0 | DISCHARGE

## 2023-07-20 RX ORDER — SIMVASTATIN 20 MG/1
40 TABLET, FILM COATED ORAL AT BEDTIME
Refills: 0 | Status: DISCONTINUED | OUTPATIENT
Start: 2023-07-20 | End: 2023-07-21

## 2023-07-20 RX ADMIN — Medication 40 MILLIGRAM(S): at 17:58

## 2023-07-20 RX ADMIN — SIMVASTATIN 40 MILLIGRAM(S): 20 TABLET, FILM COATED ORAL at 21:28

## 2023-07-20 NOTE — CHART NOTE - NSCHARTNOTEFT_GEN_A_CORE
PRE-OP DIAGNOSIS:    Severe AS and moderate mitral regurgitation     PROCEDURE:     [x] LHC     [x] RHC     [] Intervention (see below)         PHYSICIAN:  Dr. Kaur     ASSISTANT:  Dr. Venegas       PROCEDURE DESCRIPTION:     Consent:      [x] Patient       Anesthesia:     [x] Sedation     [x] Local        Access & Closure:     [x] 6Fr Radial Artery , Radial vasc band    [x]6 Fr Brachial Vein       IV Contrast: 50mL        Intervention: None      Implants: None     FINDINGS:     Coronary Dominance: Right     LM: Average size, Minor luminal irregularities.     LAD:   P-LAD: Mild disease  Mid-LAD: Prior stent is patent.  Distal LAD: Mild disease.     CX:   P-LCX: Mild disease  Mid-LAD: mild disease  Distal LAD: Mild disease  OM1: Proximal area has 40 percent disease    RCA:   P-RCA: prior stent patent  Mid-RCA: Prior stent patent  Distal RCA: mild disease  RPDA: Mild disease.   RPL: Mild disease.       LVEDP: Not measured     EF: 60% on DAVIAN       ESTIMATED BLOOD LOSS: < 10 mL      CONDITION:     [x] Fair     SPECIMEN REMOVED: N/A       POST-OP DIAGNOSIS:      Patent LAD and RCA stents. No other significant CAD.  PCWP is 30 mmHg with V wave upto 50 mmHg.  On DAVIAN patient has severe AS and moderate ro severe MR.        PLAN OF CARE:     We will give Lasix 40 mg IV and will re-evaluate. If breathing gets better plan to discharge home.    [] Medications: Lasix 40 mg IV x once.    [] IV Fluids: No fluids, PCWP is 30 mmHg. PRE-OP DIAGNOSIS:    Severe AS and moderate mitral regurgitation     PROCEDURE:     [x] LHC     [x] RHC     [] Intervention (see below)         PHYSICIAN:  Dr. Kaur     ASSISTANT:  Dr. Venegas       PROCEDURE DESCRIPTION:     Consent:      [x] Patient       Anesthesia:     [x] Sedation     [x] Local        Access & Closure:     [x] 6Fr Radial Artery , Radial vasc band    [x]6 Fr Brachial Vein       IV Contrast: 50mL        Intervention: None      Implants: None     FINDINGS:     Coronary Dominance: Right     LM: Average size, Minor luminal irregularities.     LAD:   P-LAD: Mild disease  Mid-LAD: Prior stent is patent.  Distal LAD: Mild disease.     CX:   P-LCX: Mild disease  Mid-LAD: mild disease  Distal LAD: Mild disease  OM1: Proximal area has 40 percent disease    RCA:   P-RCA: prior stent patent  Mid-RCA: Prior stent patent  Distal RCA: mild disease  RPDA: Mild disease.   RPL: Mild disease.     RIGHT HEART CATH:   PCWP is 30 mmHg with V wave upto 50 mmHg.  PA: 58/18/37  RA: 16/10/10  RV: 68/5/14    LVEDP: Not measured     EF: 60% on DAVIAN       ESTIMATED BLOOD LOSS: < 10 mL      CONDITION:     [x] Fair     SPECIMEN REMOVED: N/A       POST-OP DIAGNOSIS:      Patent LAD and RCA stents. No other significant CAD.  PCWP is 30 mmHg with V wave upto 50 mmHg.  On DAVIAN patient has severe AS and moderate ro severe MR.        PLAN OF CARE:     We will give Lasix 40 mg IV and will re-evaluate. If breathing gets better plan to discharge home.    [] Medications: Lasix 40 mg IV x once.    [] IV Fluids: No fluids, PCWP is 30 mmHg.

## 2023-07-20 NOTE — CHART NOTE - NSCHARTNOTEFT_GEN_A_CORE
POST OPERATIVE PROCEDURAL DOCUMENTATION  PRE-OP DIAGNOSIS:  SOB    POST-OP DIAGNOSIS: Severe AS, Mild AR, Mod to severe MR    PROCEDURE: Transesophageal Echocardiogram     Primary Physician: Dr. Kaur  Cardiology Fellow: Glenn    ANESTHESIA TYPE  [  ] General Anesthesia  [ x ] Conscious Sedation  [  ] Local/Regional    CONDITION  [  ] Critical  [  ] Serious  [  ] Fair  [ x ] Good    SPECIMENS REMOVED (IF APPLICABLE): N/A    IMPLANTS (IF APPLICABLE): None    ESTIMATED BLOOD LOSS: None    COMPLICATIONS: None    After risks and benefits of procedures were explained, informed consent was obtained and placed in chart.   The patient received topical anesthetic to the oropharynx with viscous lidocaine and benzocaine spray.  Refer to Anesthesia note for sedation details.  The DAVIAN probe was passed into the esophagus without difficulty.  Transesophageal and transgastric images were obtained.  The DAVIAN probe was removed without difficulty and examined.  There was no evidence for bleeding.  The patient tolerated the procedure well without any immediate DAVIAN-related complications.      Preliminary Findings:  LA: Mildly enlarged  IDA: Left atrial appendage was clear of clot and smoke. Normal doppler velocities   LV: LVEF was estimated at 55-65%  MV: mod to severe MR with posteriorly restricted leaflet and calcification, No MS  AV: mild AI, severe AS, Calcified valve  RA: Mildly enlarged  RV: Normal size and function  TV: No TR  PV: No MA, no PS  IAS: No PFO or ASD. No R-> L shunt   Aorta: There was mild, non-mobile atheroma seen in the thoracic aorta.    DIAGNOSIS/IMPRESSION:    Full report to follow    PLAN OF CARE:  [x] Discharge home when stable and fully awake.  [x] No eating or drinking for 1 hour  [x] No driving for 24 hours  [X} Proceed with LHC/RHC    Results of procedure/ plan of care discussed with patient/  in detail.

## 2023-07-20 NOTE — PRE-ANESTHESIA EVALUATION ADULT - NSDENTALSD_ENT_ALL_CORE
Bedside and Verbal shift change report given to Tonny Guy (oncoming nurse) by Cece Juarez (offgoing nurse). Report included the following information SBAR, Kardex, MAR and Recent Results. appears normal and intact/caps/bridge/implants

## 2023-07-20 NOTE — ASU PATIENT PROFILE, ADULT - NSICDXPASTMEDICALHX_GEN_ALL_CORE_FT
PAST MEDICAL HISTORY:  CAD S/P percutaneous coronary angioplasty     GERD (gastroesophageal reflux disease)     Hyperlipidemia, unspecified hyperlipidemia type     Mitral valve prolapse     Rheumatic fever

## 2023-07-21 ENCOUNTER — TRANSCRIPTION ENCOUNTER (OUTPATIENT)
Age: 68
End: 2023-07-21

## 2023-07-21 LAB
ANION GAP SERPL CALC-SCNC: 12 MMOL/L — SIGNIFICANT CHANGE UP (ref 7–14)
BUN SERPL-MCNC: 24 MG/DL — HIGH (ref 10–20)
CALCIUM SERPL-MCNC: 9.5 MG/DL — SIGNIFICANT CHANGE UP (ref 8.4–10.4)
CHLORIDE SERPL-SCNC: 106 MMOL/L — SIGNIFICANT CHANGE UP (ref 98–110)
CO2 SERPL-SCNC: 23 MMOL/L — SIGNIFICANT CHANGE UP (ref 17–32)
CREAT SERPL-MCNC: 0.8 MG/DL — SIGNIFICANT CHANGE UP (ref 0.7–1.5)
EGFR: 96 ML/MIN/1.73M2 — SIGNIFICANT CHANGE UP
GLUCOSE SERPL-MCNC: 105 MG/DL — HIGH (ref 70–99)
HCT VFR BLD CALC: 35.1 % — LOW (ref 42–52)
HGB BLD-MCNC: 11.6 G/DL — LOW (ref 14–18)
MAGNESIUM SERPL-MCNC: 1.8 MG/DL — SIGNIFICANT CHANGE UP (ref 1.8–2.4)
MCHC RBC-ENTMCNC: 28.8 PG — SIGNIFICANT CHANGE UP (ref 27–31)
MCHC RBC-ENTMCNC: 33 G/DL — SIGNIFICANT CHANGE UP (ref 32–37)
MCV RBC AUTO: 87.1 FL — SIGNIFICANT CHANGE UP (ref 80–94)
NRBC # BLD: 0 /100 WBCS — SIGNIFICANT CHANGE UP (ref 0–0)
PLATELET # BLD AUTO: 175 K/UL — SIGNIFICANT CHANGE UP (ref 130–400)
PMV BLD: 11.2 FL — HIGH (ref 7.4–10.4)
POTASSIUM SERPL-MCNC: 3.8 MMOL/L — SIGNIFICANT CHANGE UP (ref 3.5–5)
POTASSIUM SERPL-SCNC: 3.8 MMOL/L — SIGNIFICANT CHANGE UP (ref 3.5–5)
RBC # BLD: 4.03 M/UL — LOW (ref 4.7–6.1)
RBC # FLD: 13.8 % — SIGNIFICANT CHANGE UP (ref 11.5–14.5)
SODIUM SERPL-SCNC: 141 MMOL/L — SIGNIFICANT CHANGE UP (ref 135–146)
WBC # BLD: 6.67 K/UL — SIGNIFICANT CHANGE UP (ref 4.8–10.8)
WBC # FLD AUTO: 6.67 K/UL — SIGNIFICANT CHANGE UP (ref 4.8–10.8)

## 2023-07-21 PROCEDURE — 93010 ELECTROCARDIOGRAM REPORT: CPT

## 2023-07-21 RX ORDER — LANOLIN ALCOHOL/MO/W.PET/CERES
3 CREAM (GRAM) TOPICAL AT BEDTIME
Refills: 0 | Status: DISCONTINUED | OUTPATIENT
Start: 2023-07-21 | End: 2023-07-21

## 2023-07-21 RX ADMIN — Medication 81 MILLIGRAM(S): at 11:42

## 2023-07-21 RX ADMIN — LOSARTAN POTASSIUM 50 MILLIGRAM(S): 100 TABLET, FILM COATED ORAL at 07:17

## 2023-07-21 RX ADMIN — Medication 3 MILLIGRAM(S): at 01:48

## 2023-07-21 NOTE — DISCHARGE NOTE PROVIDER - NSDCMRMEDTOKEN_GEN_ALL_CORE_FT
Aspirin Enteric Coated 81 mg oral delayed release tablet: 1 tab(s) orally once a day  losartan 50 mg oral tablet: 1 orally once a day  rosuvastatin 10 mg oral tablet: 1 tab(s) orally once a day  tadalafil 5 mg oral tablet: 1 orally once a day  Toprol-XL 25 mg oral tablet, extended release: 1 tab(s) orally once a day

## 2023-07-21 NOTE — DISCHARGE NOTE PROVIDER - CARE PROVIDER_API CALL
Nash Kaur  Interventional Cardiology  [address] [place] [address]  Phone: [phone]  Fax: [phone]  Follow Up Time: 1 week

## 2023-07-21 NOTE — DISCHARGE NOTE NURSING/CASE MANAGEMENT/SOCIAL WORK - NSDCPEFALRISK_GEN_ALL_CORE
For information on Fall & Injury Prevention, visit: [email or web address] OR  [email or web address] OR  https://www.cdc.gov/steadi/patient.html

## 2023-07-21 NOTE — DISCHARGE NOTE NURSING/CASE MANAGEMENT/SOCIAL WORK - PATIENT PORTAL LINK FT
You can access the FollowMyHealth Patient Portal offered by [facility] by registering at the following website: [email or web address]. By joining [employer]’s FollowMyHealth portal, you will also be able to view your health information using other applications (apps) compatible with our system.

## 2023-07-21 NOTE — DISCHARGE NOTE PROVIDER - NSDCACTIVITY_GEN_ALL_CORE
Showering allowed/Stairs allowed/No heavy lifting/straining/Walking - Outdoors allowed/Follow Instructions Provided by your Surgical Team

## 2023-07-21 NOTE — DISCHARGE NOTE PROVIDER - NSDCCPCAREPLAN_GEN_ALL_CORE_FT
PRINCIPAL DISCHARGE DIAGNOSIS  Diagnosis: Severe aortic stenosis  Assessment and Plan of Treatment: You were found to have narrowing of your major artery called the aorta. Catheterization showed that your coronary vessels were stable. Plan is to continue medical management and closely follow up with Dr. Islas and your PCP. Resume home medications. F/U with Cardiologist in one week.

## 2023-07-21 NOTE — DISCHARGE NOTE PROVIDER - HOSPITAL COURSE
HPI:  Patient is a 67y Male with PMH of MVP, HLD, rheumatic fever, HTN, GERD, [place] on CPAP @ night, CAD s/p YOCASTA x2 pLAD (1/2020) & YOCASTA x3 RCA (4/2018), with diagnostic cardiac cath on 4/5/22 with patent stent sites, who presented to his cardiologist with c/o SOB on minimal exertion, that mainly occurs after eating.  Pt underwent DAVIAN today which revealed severe AS and moderate to severe MR (see below).  Pt is now referred for R and L cardiac cath with possible intervention if clinically indicated.    VASCULAR:  + Rad / + PTs / +  DPs          EXTREMITY:              Right Radial: pressure dressing removed, access site soft, no hematoma, no pain, + pulses, no sign of infection, no numbness    A/P:  I discussed the case with Cardiologist Dr. Kaur and recommend the following:  S/P LHC:  Access & Closure:     [x] 6Fr Radial Artery , Radial vasc band    [x]6 Fr Brachial Vein     IV Contrast: 50mL      Intervention: None    Implants: None     FINDINGS:     Coronary Dominance: Right     LM: Average size, Minor luminal irregularities.     LAD:   P-LAD: Mild disease  Mid-LAD: Prior stent is patent.  Distal LAD: Mild disease.     CX:   P-LCX: Mild disease  Mid-LAD: mild disease  Distal LAD: Mild disease  OM1: Proximal area has 40 percent disease    RCA:   P-RCA: prior stent patent  Mid-RCA: Prior stent patent  Distal RCA: mild disease  RPDA: Mild disease.   RPL: Mild disease.     RIGHT HEART CATH:   PCWP is 30 mmHg with V wave upto 50 mmHg.  PA: 58/18/37  RA: 16/10/10  RV: 68/5/14    LVEDP: Not measured     EF: 60% on DAVIAN                       Patient stayed overnight after LHC due to fluid overload, Lasix 40 mg IVP x 2 doses given with good urine response, patient feels much better today, lungs CTA b/l, POx 98% on RA                         Ambulate patient around the unit   	       Continue Aspirin 81 mg PO Daily, ARB, B-Blocker, Statin Therapy                   Post cath instructions, access site care and activity restrictions reviewed with patient                     Discussed with patient to return to hospital if experience chest pain, shortness breath, dizziness and site bleeding                   Aggressive risk factor modification, diet counseling, smoking cessation discussed with patient                       Can discharge patient from cardiac standpoint after ambulating without symptoms and access site wnl, ECG and blood work reviewed                    Benefits of Cardiac Rehab discussed with patient, All documents sent to [facility]. Patient instructed to call and make first                               appointment after first f/u visit with Cardiologist                    Follow up with Cardiology Dr. Kaur in one week. Instructed to call and make an appointment

## 2023-07-21 NOTE — PROGRESS NOTE ADULT - SUBJECTIVE AND OBJECTIVE BOX
Cardiology Follow up s/p [facility]    NAVEEN DAVIS   67y Male  PAST MEDICAL & SURGICAL HISTORY:    Mitral valve prolapse      Rheumatic fever      Hyperlipidemia, unspecified hyperlipidemia type      GERD (gastroesophageal reflux disease)      CAD S/P percutaneous coronary angioplasty      [place] on CPAP      H/O partial nephrectomy      H/O prostatectomy           HPI:  Patient is a 67y Male with PMH of MVP, HLD, rheumatic fever, HTN, GERD, [place] on CPAP @ night, CAD s/p YOCASTA x2 pLAD (1/2020) & YOCASTA x3 RCA (4/2018), with diagnostic cardiac cath on 4/5/22 with patent stent sites, who presented to his cardiologist with c/o SOB on minimal exertion, that mainly occurs after eating.  Pt underwent DAVIAN today which revealed severe AS and moderate to severe MR (see below).  Pt is now referred for R and L cardiac cath with possible intervention if clinically indicated.    DAVIAN 7/20/23:  Preliminary Findings:  LA: Mildly enlarged  IDA: Left atrial appendage was clear of clot and smoke. Normal doppler velocities   LV: LVEF was estimated at 55-65%  MV: mod to severe MR with posteriorly restricted leaflet and calcification, No MS  AV: mild AI, severe AS, Calcified valve  RA: Mildly enlarged  RV: Normal size and function  TV: No TR  PV: No [place], no PS  IAS: No PFO or ASD. No R-> L shunt   Aorta: There was mild, non-mobile atheroma seen in the thoracic aorta.    Cardiac Cath 4/5/22:  FINDINGS OF PROCEDURE:  The coronary circulation is right dominant. There was no angiographic evidence for occlusive coronary artery disease. Left main: Angiography showed minor luminal irregularities with no flow limiting lesions. LAD: Angiography showed mild atherosclerosis with no flow limiting lesions. Patent prior stents. 1st diagonal: There was a discrete 40 % stenosis at the ostium of the vessel segment. Proximal circumflex: Angiography showed mild atherosclerosis with no flow limiting lesions. Distal circumflex: Angiography showed minor luminal irregularities with no flow limiting lesions. 1st obtuse marginal: The vessel was large sized. There was a discrete 60 % stenosis at the ostium of the vessel segment. RCA: Angiography showed minor luminal irregularities with no flow limiting lesions. Right PDA: The vessel was large sized. Angiography showed minor luminal irregularities with no flow limiting lesions. Right posterolateral segment: The vessel was small sized.     Allergies    No Known Allergies    Intolerances    Patient seen and examined at bedside. No acute events overnight.  Patient without complaints. Pt ambulated without issues/symptoms  Denies CP, SOB, palpitations, or dizziness  No events on telemetry overnight    Vital Signs Last 24 Hrs  T(C): 36.7 (20 Jul 2023 20:00), Max: 36.8 (20 Jul 2023 19:50)  T(F): 98.1 (20 Jul 2023 20:00), Max: 98.2 (20 Jul 2023 19:50)  HR: 64 (20 Jul 2023 20:00) (63 - 64)  BP: 108/71 (20 Jul 2023 20:00) (108/71 - 125/64)  BP(mean): --  RR: 18 (20 Jul 2023 20:00) (18 - 18)  SpO2: 99% (20 Jul 2023 20:00) (98% - 99%)    Parameters below as of 20 Jul 2023 20:00  Patient On (Oxygen Delivery Method): room air    MEDICATIONS  (STANDING):  aspirin enteric coated 81 milliGRAM(s) Oral daily  losartan 50 milliGRAM(s) Oral daily  metoprolol succinate ER 25 milliGRAM(s) Oral daily  simvastatin 40 milliGRAM(s) Oral at bedtime    MEDICATIONS  (PRN):  melatonin 3 milliGRAM(s) Oral at bedtime PRN Insomnia      REVIEW OF SYSTEMS:          All negative except as mentioned in HPI    PHYSICAL EXAM:           CONSTITUTIONAL: Well-developed; well-nourished; in no acute distress  	SKIN: warm, dry  	HEAD: Normocephalic; atraumatic  	EYES: PERRL.  	ENT: No nasal discharge, airway clear, mucous membranes moist  	NECK: Supple; non tender.  	CARD: +S1, +S2, no murmurs, gallops, or rubs. Regular rate and rhythm    	RESP: No wheezes, rales or rhonchi. CTA B/L  	ABD: soft ntnd, + BS x 4 quadrants  	EXT: moves all extremities,  no clubbing, cyanosis or edema  	NEURO: Alert and oriented x3, no focal deficits          PSYCH: Cooperative, appropriate          VASCULAR:  + Rad / + PTs / +  DPs          EXTREMITY:              Right Radial: pressure dressing removed, access site soft, no hematoma, no pain, + pulses, no sign of infection, no numbness            ECG:   < from: 12 Lead ECG (07.21.23 @ 08:15) >  Ventricular Rate 64 BPM    Atrial Rate 64 BPM    P-R Interval 138 ms    QRS Duration 102 ms    Q-T Interval 434 ms    QTC Calculation(Bazett) 447 ms    P Axis 33 degrees    R Axis -19 degrees    T Axis 95 degrees    Diagnosis Line Normal sinus rhythm  Left ventricular hypertrophy with repolarization abnormality  Cannot rule out Septal infarct , age undetermined  Abnormal ECG    Confirmed by [place]al [place] ([id number]) on 7/21/2023 8:46:44 AM                                                                                          2D ECHO:  < from: OBSERVATION (Transesophageal Echocardiogram) (07.20.23 @ 10:56) >  CardExmStatus_ExamStatus Exam Completed  pending reading     LABS:                        11.6   6.67  )-----------( 175      ( 21 Jul 2023 07:32 )             35.1     07-21    141  |  106  |  24<H>  ----------------------------<  105<H>  3.8   |  23  |  0.8    Ca    9.5      21 Jul 2023 07:32  Mg     1.8     07-21    Magnesium: 1.8 mg/dL [1.8 - 2.4] (07-21-23 @ 07:32)      A/P:  I discussed the case with Cardiologist Dr. Kaur and recommend the following:  S/P LHC:  Access & Closure:     [x] 6Fr Radial Artery , Radial vasc band    [x]6 Fr Brachial Vein     IV Contrast: 50mL      Intervention: None    Implants: None     FINDINGS:     Coronary Dominance: Right     LM: Average size, Minor luminal irregularities.     LAD:   P-LAD: Mild disease  Mid-LAD: Prior stent is patent.  Distal LAD: Mild disease.     CX:   P-LCX: Mild disease  Mid-LAD: mild disease  Distal LAD: Mild disease  OM1: Proximal area has 40 percent disease    RCA:   P-RCA: prior stent patent  Mid-RCA: Prior stent patent  Distal RCA: mild disease  RPDA: Mild disease.   RPL: Mild disease.     RIGHT HEART CATH:   PCWP is 30 mmHg with V wave upto 50 mmHg.  PA: 58/18/37  RA: 16/10/10  RV: 68/5/14    LVEDP: Not measured     EF: 60% on DAVIAN                          Ambulate patient around the unit   	     Continue Aspirin 81 mg PO Daily, ARB, B-Blocker, Statin Therapy                   Post cath instructions, access site care and activity restrictions reviewed with patient                     Discussed with patient to return to hospital if experience chest pain, shortness breath, dizziness and site bleeding                   Aggressive risk factor modification, diet counseling, smoking cessation discussed with patient                       Can discharge patient from cardiac standpoint after ambulating without symptoms and access site wnl, ECG and blood work reviewed                    Benefits of Cardiac Rehab discussed with patient, All documents sent to [facility]. Patient instructed to call and make first                               appointment after first f/u visit with Cardiologist                    Follow up with Cardiology Dr. Kaur in one week. Instructed to call and make an appointment

## 2023-07-25 RX ORDER — METOPROLOL SUCCINATE 25 MG/1
25 TABLET, EXTENDED RELEASE ORAL
Refills: 0 | Status: ACTIVE | COMMUNITY

## 2023-07-25 RX ORDER — LOSARTAN POTASSIUM 50 MG/1
50 TABLET, FILM COATED ORAL
Refills: 0 | Status: ACTIVE | COMMUNITY

## 2023-07-25 RX ORDER — ASPIRIN 81 MG/1
81 TABLET, COATED ORAL
Refills: 0 | Status: ACTIVE | COMMUNITY

## 2023-07-25 RX ORDER — ROSUVASTATIN CALCIUM 10 MG/1
10 TABLET, FILM COATED ORAL
Refills: 0 | Status: ACTIVE | COMMUNITY

## 2023-07-25 RX ORDER — TADALAFIL 5 MG/1
5 TABLET ORAL
Refills: 0 | Status: ACTIVE | COMMUNITY

## 2023-07-27 ENCOUNTER — APPOINTMENT (OUTPATIENT)
Dept: CARDIOTHORACIC SURGERY | Facility: CLINIC | Age: 68
End: 2023-07-27
Payer: MEDICARE

## 2023-07-27 VITALS
DIASTOLIC BLOOD PRESSURE: 83 MMHG | WEIGHT: 207 LBS | BODY MASS INDEX: 29.63 KG/M2 | RESPIRATION RATE: 12 BRPM | HEIGHT: 70 IN | SYSTOLIC BLOOD PRESSURE: 155 MMHG | TEMPERATURE: 98 F | HEART RATE: 62 BPM

## 2023-07-27 DIAGNOSIS — Z87.891 PERSONAL HISTORY OF NICOTINE DEPENDENCE: ICD-10-CM

## 2023-07-27 DIAGNOSIS — Z82.49 FAMILY HISTORY OF ISCHEMIC HEART DISEASE AND OTHER DISEASES OF THE CIRCULATORY SYSTEM: ICD-10-CM

## 2023-07-27 DIAGNOSIS — I35.0 NONRHEUMATIC AORTIC (VALVE) STENOSIS: ICD-10-CM

## 2023-07-27 DIAGNOSIS — Z86.79 PERSONAL HISTORY OF OTHER DISEASES OF THE CIRCULATORY SYSTEM: ICD-10-CM

## 2023-07-27 DIAGNOSIS — I34.0 NONRHEUMATIC MITRAL (VALVE) INSUFFICIENCY: ICD-10-CM

## 2023-07-27 PROCEDURE — 99203 OFFICE O/P NEW LOW 30 MIN: CPT

## 2023-07-27 NOTE — PHYSICAL EXAM
[General Appearance - Alert] : alert [Sclera] : the sclera and conjunctiva were normal [PERRL With Normal Accommodation] : pupils were equal in size, round, and reactive to light [Extraocular Movements] : extraocular movements were intact [Outer Ear] : the ears and nose were normal in appearance [Hearing Threshold Finger Rub Not [place]] : hearing was normal [Both Tympanic Membranes Were Examined] : both tympanic membranes were normal [Neck Appearance] : the appearance of the neck was normal [Neck Cervical Mass (___cm)] : no neck mass was observed [] : no respiratory distress [Respiration, Rhythm And Depth] : normal respiratory rhythm and effort [Exaggerated Use Of Accessory Muscles For Inspiration] : no accessory muscle use [Apical Impulse] : the apical impulse was normal [Heart Rate And Rhythm] : heart rate was normal and rhythm regular [Heart Sounds] : normal S1 and S2 [Examination Of The Chest] : the chest was normal in appearance [Bowel Sounds] : normal bowel sounds [Abnormal Walk] : normal gait [Nail Clubbing] : no clubbing  or cyanosis of the fingernails [Musculoskeletal - Swelling] : no joint swelling seen [Skin Color & Pigmentation] : normal skin color and pigmentation [Cranial Nerves] : cranial nerves 2-12 were intact [Deep Tendon Reflexes (DTR)] : deep tendon reflexes were 2+ and symmetric [Sensation] : the sensory exam was normal to light touch and pinprick [Oriented To Time, Place, And Person] : oriented to person, place, and time [Impaired Insight] : insight and judgment were intact [Affect] : the affect was normal

## 2023-08-03 NOTE — REVIEW OF SYSTEMS
[SOB on Exertion] : shortness of breath during exertion [Negative] : Heme/Lymph [Fever] : no fever [Chills] : no chills [Feeling Poorly] : not feeling poorly [Feeling Tired] : not feeling tired [Earache] : no earache [Loss Of Hearing] : no hearing loss [Nosebleeds] : no nosebleeds [Nasal Discharge] : no nasal discharge [Heart Rate Is Slow] : the heart rate was not slow [Heart Rate Is Fast] : the heart rate was not fast [Chest Pain] : no chest pain [Palpitations] : no palpitations [Shortness Of Breath] : no shortness of breath [Wheezing] : no wheezing [Cough] : no cough

## 2023-08-03 NOTE — HISTORY OF PRESENT ILLNESS
[FreeTextEntry1] : Mr. Adrian Yanez is a 66y/o  Male, former smoker, here today for discussion of severe aortic stenosis and mod - severe mitral regurgitation.  PMH of MVP, HLD, rheumatic fever, Prostate Cancer S/P Prostatectomy 6/2023 HTN, GERD, [place] on CPAP @ night, CAD s/p YOCASTA x2 pLAD (1/2020) & YOCASTA x3 RCA (4/2018), with diagnostic cardiac cath on 4/5/22 with patent stent sites, who presented to his cardiologist with c/o SOB on minimal exertion, that mainly occurs after eating. Pt underwent DAVIAN which revealed severe AS and moderate to severe MR.  Pt is now s/p R and L cardiac cath no intervention, mid LAD and mid RCA  prior stent patent, OM1 Proximal area has 40 percent disease. \par \par Symptoms include SOB.  All questions and concerns were addressed with the patient. Pre-op planning was discussed with the patient, including dental clearance. Patient was educated on the risks and alternatives to surgery. STS was calculated and discussed with the pt\par \par NYHA class II\par CCS class: I\par 6 Minute Walk: Ambulated 1000 meters in 6 minutes, with no SOB\par 1 PPD X 10 years- quit in 30s \par Pt lives home with wife no aide\par Father- S/P CABG @ 65\par Works as  \par \par Their healthcare team includes the following\par PMD: Dr. Charu Singer \par Cardio: Vincent \par Pulmonary: Dr. Guaman\par : Dr. Arreaga

## 2023-08-03 NOTE — ASSESSMENT
[FreeTextEntry1] : Mr. Adrian Yanez is a 68y/o  Male, former smoker, here today for discussion of severe aortic stenosis and mod - severe mitral regurgitation.  Here for surgical discussion.  Plan: Reviewed recent cardiac catheterization and DAVIAN with images with patient and wife Patient with Severe AS and Moderate to Severe MR Discussed risks, benefits and alternatives to surgery with patient and wife Not a candidate for TAVR/ Clip d/t anatomy- history of rheumatic fever Patient and wife wishes to seek second opinion  They will call with decision on surgery in next week or 2 If decided for Aortic Valve Replacement and Mitral Valve Replacement (Repair Unlikely) Will need preop- Carotid US, PFT, Dental Clearance and CT Chest Non Con  I, Crista Kendall [facility], am acting as scribe for   Patient seen and examined History and physical as per above Briefly, 67 year-old man with severe AS and moderate to severe MR Patient meets indications for AVR + MVR He would like to go to a second opinion at [place] and then will call office if he decides to pursue surgery  I, Dr. Flores, saw, examined, and reviewed the diagnostic images with the patient and agree with my nurse practitioners clinic note, physical exam findings, and treatment plan.  Mahesh Flores MD

## 2023-08-03 NOTE — DATA REVIEWED
[FreeTextEntry1] : FINDINGS:\par  Coronary Dominance: Right\par  LM: Average size, Minor luminal irregularities.\par  \par LAD:\par P-LAD: Mild disease\par Mid-LAD: Prior stent is patent.\par Distal LAD: Mild disease.\par  \par CX:\par P-LCX: Mild disease\par Mid-LAD: mild disease\par Distal LAD: Mild disease\par OM1: Proximal area has 40 percent disease\par  \par RCA:\par P-RCA: prior stent patent\par Mid-RCA: Prior stent patent\par Distal RCA: mild disease\par RPDA: Mild disease.\par RPL: Mild disease.\par  \par RIGHT HEART CATH:\par PCWP is 30 mmHg with V wave upto 50 mmHg.\par PA: 58/18/37\par RA: 16/10/10\par RV: 68/5/14\par  \par LVEDP: Not measured\par  \par EF: 60% on DAVIAN

## 2023-08-09 ENCOUNTER — NON-APPOINTMENT (OUTPATIENT)
Age: 68
End: 2023-08-09

## 2023-08-10 PROBLEM — G47.33 OBSTRUCTIVE SLEEP APNEA (ADULT) (PEDIATRIC): Chronic | Status: ACTIVE | Noted: 2023-07-20

## 2023-09-08 PROBLEM — K21.9 GASTRO-ESOPHAGEAL REFLUX DISEASE WITHOUT ESOPHAGITIS: Chronic | Status: ACTIVE | Noted: 2023-07-19

## 2023-09-08 PROBLEM — I25.10 ATHEROSCLEROTIC HEART DISEASE OF NATIVE CORONARY ARTERY WITHOUT ANGINA PECTORIS: Chronic | Status: ACTIVE | Noted: 2023-07-19

## 2023-09-12 ENCOUNTER — OUTPATIENT (OUTPATIENT)
Dept: OUTPATIENT SERVICES | Facility: HOSPITAL | Age: 68
LOS: 1 days | End: 2023-09-12
Payer: MEDICARE

## 2023-09-12 ENCOUNTER — INPATIENT (INPATIENT)
Facility: HOSPITAL | Age: 68
LOS: 0 days | Discharge: HOME HEALTH | DRG: 947 | End: 2023-09-13
Attending: HOSPITALIST | Admitting: STUDENT IN AN ORGANIZED HEALTH CARE EDUCATION/TRAINING PROGRAM
Payer: MEDICARE

## 2023-09-12 ENCOUNTER — APPOINTMENT (OUTPATIENT)
Dept: MEDICATION MANAGEMENT | Facility: CLINIC | Age: 68
End: 2023-09-12

## 2023-09-12 VITALS
SYSTOLIC BLOOD PRESSURE: 181 MMHG | OXYGEN SATURATION: 98 % | TEMPERATURE: 98 F | HEIGHT: 70 IN | WEIGHT: 197.98 LBS | HEART RATE: 98 BPM | RESPIRATION RATE: 14 BRPM | DIASTOLIC BLOOD PRESSURE: 88 MMHG

## 2023-09-12 DIAGNOSIS — I48.91 UNSPECIFIED ATRIAL FIBRILLATION: ICD-10-CM

## 2023-09-12 DIAGNOSIS — F22 DELUSIONAL DISORDERS: ICD-10-CM

## 2023-09-12 DIAGNOSIS — Z90.79 ACQUIRED ABSENCE OF OTHER GENITAL ORGAN(S): Chronic | ICD-10-CM

## 2023-09-12 DIAGNOSIS — Z79.01 LONG TERM (CURRENT) USE OF ANTICOAGULANTS: ICD-10-CM

## 2023-09-12 DIAGNOSIS — Z90.5 ACQUIRED ABSENCE OF KIDNEY: Chronic | ICD-10-CM

## 2023-09-12 LAB
ALBUMIN SERPL ELPH-MCNC: 3.8 G/DL — SIGNIFICANT CHANGE UP (ref 3.5–5.2)
ALP SERPL-CCNC: 94 U/L — SIGNIFICANT CHANGE UP (ref 30–115)
ALT FLD-CCNC: 118 U/L — HIGH (ref 0–41)
ANION GAP SERPL CALC-SCNC: 15 MMOL/L — HIGH (ref 7–14)
APPEARANCE UR: CLEAR — SIGNIFICANT CHANGE UP
APTT BLD: 36.9 SEC — SIGNIFICANT CHANGE UP (ref 27–39.2)
AST SERPL-CCNC: 52 U/L — HIGH (ref 0–41)
BASOPHILS # BLD AUTO: 0.05 K/UL — SIGNIFICANT CHANGE UP (ref 0–0.2)
BASOPHILS NFR BLD AUTO: 0.5 % — SIGNIFICANT CHANGE UP (ref 0–1)
BILIRUB SERPL-MCNC: 0.4 MG/DL — SIGNIFICANT CHANGE UP (ref 0.2–1.2)
BILIRUB UR-MCNC: NEGATIVE — SIGNIFICANT CHANGE UP
BUN SERPL-MCNC: 21 MG/DL — HIGH (ref 10–20)
CALCIUM SERPL-MCNC: 9.2 MG/DL — SIGNIFICANT CHANGE UP (ref 8.4–10.4)
CHLORIDE SERPL-SCNC: 101 MMOL/L — SIGNIFICANT CHANGE UP (ref 98–110)
CO2 SERPL-SCNC: 23 MMOL/L — SIGNIFICANT CHANGE UP (ref 17–32)
COLOR SPEC: YELLOW — SIGNIFICANT CHANGE UP
CREAT SERPL-MCNC: 1.1 MG/DL — SIGNIFICANT CHANGE UP (ref 0.7–1.5)
DIFF PNL FLD: NEGATIVE — SIGNIFICANT CHANGE UP
EGFR: 73 ML/MIN/1.73M2 — SIGNIFICANT CHANGE UP
EOSINOPHIL # BLD AUTO: 0.36 K/UL — SIGNIFICANT CHANGE UP (ref 0–0.7)
EOSINOPHIL NFR BLD AUTO: 3.6 % — SIGNIFICANT CHANGE UP (ref 0–8)
GLUCOSE SERPL-MCNC: 162 MG/DL — HIGH (ref 70–99)
GLUCOSE UR QL: NEGATIVE MG/DL — SIGNIFICANT CHANGE UP
HCT VFR BLD CALC: 30.6 % — LOW (ref 42–52)
HGB BLD-MCNC: 10.1 G/DL — LOW (ref 14–18)
IMM GRANULOCYTES NFR BLD AUTO: 0.6 % — HIGH (ref 0.1–0.3)
INR BLD: 2.54 RATIO — HIGH (ref 0.65–1.3)
INR PPP: 2.3 RATIO
KETONES UR-MCNC: NEGATIVE MG/DL — SIGNIFICANT CHANGE UP
LEUKOCYTE ESTERASE UR-ACNC: NEGATIVE — SIGNIFICANT CHANGE UP
LYMPHOCYTES # BLD AUTO: 0.69 K/UL — LOW (ref 1.2–3.4)
LYMPHOCYTES # BLD AUTO: 6.8 % — LOW (ref 20.5–51.1)
MCHC RBC-ENTMCNC: 28.1 PG — SIGNIFICANT CHANGE UP (ref 27–31)
MCHC RBC-ENTMCNC: 33 G/DL — SIGNIFICANT CHANGE UP (ref 32–37)
MCV RBC AUTO: 85 FL — SIGNIFICANT CHANGE UP (ref 80–94)
MONOCYTES # BLD AUTO: 1.21 K/UL — HIGH (ref 0.1–0.6)
MONOCYTES NFR BLD AUTO: 12 % — HIGH (ref 1.7–9.3)
NEUTROPHILS # BLD AUTO: 7.75 K/UL — HIGH (ref 1.4–6.5)
NEUTROPHILS NFR BLD AUTO: 76.5 % — HIGH (ref 42.2–75.2)
NITRITE UR-MCNC: NEGATIVE — SIGNIFICANT CHANGE UP
NRBC # BLD: 0 /100 WBCS — SIGNIFICANT CHANGE UP (ref 0–0)
PH UR: 7.5 — SIGNIFICANT CHANGE UP (ref 5–8)
PLATELET # BLD AUTO: 272 K/UL — SIGNIFICANT CHANGE UP (ref 130–400)
PMV BLD: 10 FL — SIGNIFICANT CHANGE UP (ref 7.4–10.4)
POCT-PROTHROMBIN TIME: 27.3 SECS
POTASSIUM SERPL-MCNC: 4.6 MMOL/L — SIGNIFICANT CHANGE UP (ref 3.5–5)
POTASSIUM SERPL-SCNC: 4.6 MMOL/L — SIGNIFICANT CHANGE UP (ref 3.5–5)
PROT SERPL-MCNC: 6.6 G/DL — SIGNIFICANT CHANGE UP (ref 6–8)
PROT UR-MCNC: SIGNIFICANT CHANGE UP MG/DL
PROTHROM AB SERPL-ACNC: 29.8 SEC — HIGH (ref 9.95–12.87)
QUALITY CONTROL: YES
RBC # BLD: 3.6 M/UL — LOW (ref 4.7–6.1)
RBC # FLD: 13.4 % — SIGNIFICANT CHANGE UP (ref 11.5–14.5)
SODIUM SERPL-SCNC: 139 MMOL/L — SIGNIFICANT CHANGE UP (ref 135–146)
SP GR SPEC: >1.03 — HIGH (ref 1–1.03)
TROPONIN T SERPL-MCNC: 0.21 NG/ML — CRITICAL HIGH
UROBILINOGEN FLD QL: 1 MG/DL — SIGNIFICANT CHANGE UP (ref 0.2–1)
WBC # BLD: 10.12 K/UL — SIGNIFICANT CHANGE UP (ref 4.8–10.8)
WBC # FLD AUTO: 10.12 K/UL — SIGNIFICANT CHANGE UP (ref 4.8–10.8)

## 2023-09-12 PROCEDURE — 93010 ELECTROCARDIOGRAM REPORT: CPT | Mod: 77

## 2023-09-12 PROCEDURE — 86901 BLOOD TYPING SEROLOGIC RH(D): CPT

## 2023-09-12 PROCEDURE — 80307 DRUG TEST PRSMV CHEM ANLYZR: CPT | Mod: 59

## 2023-09-12 PROCEDURE — 87040 BLOOD CULTURE FOR BACTERIA: CPT | Mod: 59

## 2023-09-12 PROCEDURE — 36416 COLLJ CAPILLARY BLOOD SPEC: CPT

## 2023-09-12 PROCEDURE — 87389 HIV-1 AG W/HIV-1&-2 AB AG IA: CPT

## 2023-09-12 PROCEDURE — 84443 ASSAY THYROID STIM HORMONE: CPT

## 2023-09-12 PROCEDURE — 85379 FIBRIN DEGRADATION QUANT: CPT

## 2023-09-12 PROCEDURE — 0042T: CPT | Mod: MA

## 2023-09-12 PROCEDURE — 82607 VITAMIN B-12: CPT

## 2023-09-12 PROCEDURE — 86780 TREPONEMA PALLIDUM: CPT

## 2023-09-12 PROCEDURE — 83540 ASSAY OF IRON: CPT

## 2023-09-12 PROCEDURE — 93010 ELECTROCARDIOGRAM REPORT: CPT

## 2023-09-12 PROCEDURE — 81003 URINALYSIS AUTO W/O SCOPE: CPT

## 2023-09-12 PROCEDURE — 95819 EEG AWAKE AND ASLEEP: CPT

## 2023-09-12 PROCEDURE — 70496 CT ANGIOGRAPHY HEAD: CPT | Mod: 26,MA

## 2023-09-12 PROCEDURE — 85610 PROTHROMBIN TIME: CPT

## 2023-09-12 PROCEDURE — 82746 ASSAY OF FOLIC ACID SERUM: CPT

## 2023-09-12 PROCEDURE — 86850 RBC ANTIBODY SCREEN: CPT

## 2023-09-12 PROCEDURE — 99211 OFF/OP EST MAY X REQ PHY/QHP: CPT

## 2023-09-12 PROCEDURE — 86803 HEPATITIS C AB TEST: CPT

## 2023-09-12 PROCEDURE — 99223 1ST HOSP IP/OBS HIGH 75: CPT

## 2023-09-12 PROCEDURE — 84484 ASSAY OF TROPONIN QUANT: CPT

## 2023-09-12 PROCEDURE — 85730 THROMBOPLASTIN TIME PARTIAL: CPT

## 2023-09-12 PROCEDURE — 82140 ASSAY OF AMMONIA: CPT

## 2023-09-12 PROCEDURE — 85025 COMPLETE CBC W/AUTO DIFF WBC: CPT

## 2023-09-12 PROCEDURE — 36415 COLL VENOUS BLD VENIPUNCTURE: CPT

## 2023-09-12 PROCEDURE — 70450 CT HEAD/BRAIN W/O DYE: CPT | Mod: 26,MA,XU

## 2023-09-12 PROCEDURE — 80053 COMPREHEN METABOLIC PANEL: CPT | Mod: 59

## 2023-09-12 PROCEDURE — 86900 BLOOD TYPING SEROLOGIC ABO: CPT

## 2023-09-12 PROCEDURE — 83735 ASSAY OF MAGNESIUM: CPT

## 2023-09-12 PROCEDURE — 83036 HEMOGLOBIN GLYCOSYLATED A1C: CPT

## 2023-09-12 PROCEDURE — 83550 IRON BINDING TEST: CPT

## 2023-09-12 PROCEDURE — 84100 ASSAY OF PHOSPHORUS: CPT

## 2023-09-12 PROCEDURE — 83880 ASSAY OF NATRIURETIC PEPTIDE: CPT

## 2023-09-12 PROCEDURE — 70498 CT ANGIOGRAPHY NECK: CPT | Mod: 26,MA

## 2023-09-12 PROCEDURE — 99285 EMERGENCY DEPT VISIT HI MDM: CPT | Mod: GC

## 2023-09-12 RX ORDER — CHLORHEXIDINE GLUCONATE 213 G/1000ML
1 SOLUTION TOPICAL
Refills: 0 | Status: DISCONTINUED | OUTPATIENT
Start: 2023-09-12 | End: 2023-09-13

## 2023-09-12 RX ORDER — METOPROLOL TARTRATE 50 MG
5 TABLET ORAL ONCE
Refills: 0 | Status: COMPLETED | OUTPATIENT
Start: 2023-09-12 | End: 2023-09-12

## 2023-09-12 RX ADMIN — Medication 5 MILLIGRAM(S): at 17:55

## 2023-09-12 NOTE — ED ADULT NURSE NOTE - NSFALLHARMRISKINTERV_ED_ALL_ED
Assistance OOB with selected safe patient handling equipment if applicable/Assistance with ambulation/Communicate risk of Fall with Harm to all staff, patient, and family/Monitor gait and stability/Provide visual cue: red socks, yellow wristband, yellow gown, etc/Reinforce activity limits and safety measures with patient and family/Bed in lowest position, wheels locked, appropriate side rails in place/Call bell, personal items and telephone in reach/Instruct patient to call for assistance before getting out of bed/chair/stretcher/Non-slip footwear applied when patient is off stretcher/[place] to call system/Physically safe environment - no spills, clutter or unnecessary equipment/Purposeful Proactive Rounding/Room/bathroom lighting operational, light cord in reach

## 2023-09-12 NOTE — ED ADULT NURSE NOTE - NSICDXPASTMEDICALHX_GEN_ALL_CORE_FT
PAST MEDICAL HISTORY:  CAD S/P percutaneous coronary angioplasty     GERD (gastroesophageal reflux disease)     Hyperlipidemia, unspecified hyperlipidemia type     Mitral valve prolapse     [place] on CPAP     Rheumatic fever

## 2023-09-12 NOTE — ED PROVIDER NOTE - PROGRESS NOTE DETAILS
AE: Code stroke called in triage.  Patient rushed to CT scan.  Neurology evaluating patient. AE: Patient's heart rate has improved.  Neurology does not believe patient's symptoms are neurological in nature.  When asked how patient is feeling, patient reports that he is God and he feels like God.  Will admit to medicine for further metabolic work-up.

## 2023-09-12 NOTE — ED PROVIDER NOTE - PHYSICAL EXAMINATION
PHYSICAL EXAM: I have reviewed current vital signs.  GENERAL: NAD, well-nourished; well-developed.  HEAD:  Normocephalic, atraumatic.  EYES: EOMI, PERRL, conjunctiva and sclera clear.  ENT: MMM, no erythema/exudates.  NECK: Supple, no JVD.  CHEST/LUNG: Clear to auscultation bilaterally; no wheezes, rales, or rhonchi.  HEART: Regular rate and rhythm, normal S1 and S2; no murmurs, rubs, or gallops.  ABDOMEN: Soft, nontender, nondistended.  EXTREMITIES:  2+ peripheral pulses; no clubbing, cyanosis, or edema.  PSYCH: Patient speaking clearly, not answering questions appropriately.  NEUROLOGY: Right-sided facial droop noted.  SKIN: Warm and dry.

## 2023-09-12 NOTE — ED ADULT NURSE NOTE - CHIEF COMPLAINT QUOTE
pt bibems from home com AMS , LKW 7pm had mitral repair and aortic replaced at [facility] on 9/5.  code stroke initiated in triage. on coumadin

## 2023-09-12 NOTE — ED PROVIDER NOTE - ATTENDING CONTRIBUTION TO CARE
67-year-old male presents to the ED for confusion, right-sided facial droop.  Last well-known at 7 PM.  Due to increasing confusion patient was brought in for evaluation.  Right-sided facial droop noted on evaluation.  Taken for CT angio and perfusion study.  Case was signed out pending CT perfusion and final disposition.

## 2023-09-12 NOTE — ED ADULT TRIAGE NOTE - CHIEF COMPLAINT QUOTE
pt bibems from home com AMS , LKW 7pm [facility]  code stroke initiated in triage. on coumadin

## 2023-09-12 NOTE — ED PROVIDER NOTE - CLINICAL SUMMARY MEDICAL DECISION MAKING FREE TEXT BOX
67yM HTN AVR/MVR 1wk ago at [facility] p/w confusion - no dysarthria but nonsensical words since 7pm last night and ?R facial droop.  NIHSS 0 on ED arrival and pt speaking coherently but w/ delusions of grandeur and trouble answering direct questions.  Labs w/ elevated troponin c/w recent open heart surgery and w/ elevated INR c/w a/c use. Imaging w/o acute pathology.  Pt intermittently tachycardic, likely afib, treated w/ extra dose metoprolol w/ improved HR.  Neuro evaluated pt and do NOT feel this is primarily neurologic.  No SI/HI.  While his delusions of grandeur and inability to answer questions may suggest psychiatric etiology, given his recent hospitalization, he would benefit from observation in medical floor to ensure no metabolic etiology for his mental status change prior to psychiatric evaluation.

## 2023-09-12 NOTE — ED ADULT NURSE NOTE - OBJECTIVE STATEMENT
Patient is a 67 year old male BIBA for AMS since last night- as per wife and daughter patient had had mitral repair and aortic replaced at [facility] on 9/5 and patient is currently on Coumadin.

## 2023-09-12 NOTE — ED PROVIDER NOTE - CARE PLAN
1 Principal Discharge DX:	Confusion   Principal Discharge DX:	Encephalopathy  Secondary Diagnosis:	Elevated troponin  Secondary Diagnosis:	Atrial fibrillation and flutter  Secondary Diagnosis:	Delusions

## 2023-09-12 NOTE — CONSULT NOTE ADULT - SUBJECTIVE AND OBJECTIVE BOX
Neurology consult    NAVEEN [id number]    HPI:    68yo man with a PMH of HTN and recent (1 week ago) open heart surgery aortic valve replacement and mitral valve repair, released from hospital on Sunday night.  On coumadin (last took last evening 630pm) and ASA, started talking nonsense last night, forgetful, making odd jokes, woke up slightly more normal but was again fully altered at 1pm.  Brought in as stroke code.  /88, FSBG 87, NIHSS 3 because he was not answering questions (believed he was god, was sarcastic) and only followed one command.  CTH prelim read with no pathology, CTA and CTP with no evident pathology on our reading, read pending.        MEDICATIONS           Family history: No history of dementia, strokes, or seizures   FAMILY HISTORY:  Family history of early CAD (Father)      SOCIAL HISTORY -- No history of tobacco or alcohol use     Allergies    No Known Allergies    Intolerances        Height (cm): 177.8 (09-12 @ 15:36)  Weight (kg): 89.8 (09-12 @ 15:36)  BMI (kg/m2): 28.4 (09-12 @ 15:36)    Vital Signs Last 24 Hrs  T(C): 36.7 (12 Sep 2023 15:36), Max: 36.7 (12 Sep 2023 15:36)  T(F): 98 (12 Sep 2023 15:36), Max: 98 (12 Sep 2023 15:36)  HR: 98 (12 Sep 2023 15:36) (98 - 98)  BP: 181/88 (12 Sep 2023 15:36) (181/88 - 181/88)  BP(mean): --  RR: 14 (12 Sep 2023 15:36) (14 - 14)  SpO2: 98% (12 Sep 2023 15:36) (98% - 98%)    Parameters below as of 12 Sep 2023 15:36  Patient On (Oxygen Delivery Method): room air          REVIEW OF SYSTEMS:    Constitutional: No fever, chills, fatigue, weakness  Eyes: no eye pain, visual disturbances, or discharge  ENT:  No difficulty hearing, tinnitus, vertigo; No sinus or throat pain  Neck: No pain or stiffness  Respiratory: No cough, dyspnea, wheezing   Cardiovascular: No chest pain, palpitations,   Gastrointestinal: No abdominal or epigastric pain. No nausea, vomiting  No diarrhea or constipation.   Genitourinary: No dysuria, frequency, hematuria or incontinence  Neurological: No headaches, lightheadedness, vertigo, numbness or tremors  Psychiatric: No depression, anxiety, mood swings or difficulty sleeping  Musculoskeletal: No joint pain or swelling; No muscle, back or extremity pain  Skin: No itching, burning, rashes or lesions   Lymph Nodes: No enlarged glands  Endocrine: No heat or cold intolerance; No hair loss, No h/o diabetes or thyroid dysfunction  Allergy and Immunologic: No hives or eczema    On Neurological Examination:  Gen:  Speaking nonsensically, making sarcastic jokes, but fully moving limbs with nonfocal exam.  · Intubated	No  · Sedated/Unresponsive	No  · NIH Stroke Scale: LOC	(0) Alert; keenly responsive  · NIH Stroke Scale: LOC Question	(2) Answers neither question correctly  · NIH Stroke Scale: LOC Command	(1) Performs one task correctly  · NIH Stroke Scale: Gaze	(0) Normal  · NIH Stroke Scale: Visual	(0) No visual loss  · NIH Stroke Scale: Facial	(0) Normal symmetrical movements  · NIH Stroke Scale: Arm Left	(0) No drift; limb holds 90 (or 45) degrees for full 10 secs  · NIH Stroke Scale: Arm Right	(0) No drift; limb holds 90 (or 45) degrees for full 10 secs  · NIH Stroke Scale: Leg Left	(0) No drift; leg holds 30 degree position for full 5 secs  · NIH Stroke Scale: Leg Right	(0) No drift; leg holds 30 degree position for full 5 secs  · NIH Stroke Scale: Ataxia	(0) Absent  · NIH Stroke Scale: Sensory	(0) Normal; no sensory loss  · NIH Stroke Scale: Language	(0) No aphasia; normal  · NIH Stroke Scale: Dysarthria	(0) Normal  · NIH Stroke Scale: Extinct Inattention	(0) No abnormality  · NIH Stroke Scale: Total	3                  LABS:            Hemoglobin A1C:       Vitamin B12         RADIOLOGY    EKG

## 2023-09-12 NOTE — CONSULT NOTE ADULT - ASSESSMENT
68yo man with a PMH of HTN and recent (1 week ago) open heart surgery aortic valve replacement and mitral valve repair, released from hospital on Sunday night.  On coumadin (last took last evening 630pm) and ASA, started talking nonsense last night, forgetful, making odd jokes, woke up slightly more normal but was again fully altered at 1pm.  Brought in as stroke code.  /88, FSBG 87, NIHSS 3 because he was not answering questions (believed he was god, was sarcastic) and only followed one command.  CTH prelim read with no pathology, CTA and CTP with no evident pathology on our reading, read pending.   Altered mentation possibly 2/2 polypharmacy, unlikely ischemia.  vs. psychiatric/conversion.    Recommendations:    - metabolic workup  - infective workup

## 2023-09-12 NOTE — ED PROVIDER NOTE - OBJECTIVE STATEMENT
67-year-old male with past medical history of HTN and aortic valve replacement/mitral valve repair 1 week ago, presents to the ED due to confusion.  As per family, patient began speaking clear words that did not make sense since 7 PM last night.  Patient also had associated right-sided facial droop.  Code stroke was called and triage.

## 2023-09-12 NOTE — CONSULT NOTE ADULT - ATTENDING COMMENTS
67 year old man w/ PMH of HTN, recent open heart aortic valve replacement on Coumadin presented w/ AMS.     On exam, patient fluent, follow some simple commands, impaired repetition. Oriented to self, states "[place]". Able to identify daugther and wife. Appears to attend to examiner well. EOMI; VFF; FAce symmetric. MENCHACA spontaneously at least antigravity .    CTH neg  CTA h/n neg  CTP neg    Imp: AMS likely tox/met encephelopathy or psychiatric.     Plan:   MR brain w/w/o contrast if does not improve  Utox  Consider psych eval  If spike fever or leukocytosis without obvious source, would consider CNS infection but less likely at this time.     70 minutes spent on total encounter. The necessity of the time spent during the encounter on this date of service was due to:     Review of imaging and chart; obtaining history; examination of pt; discussion and coordination of care, and discussion of lifestyle modification and risk factor control.

## 2023-09-13 ENCOUNTER — TRANSCRIPTION ENCOUNTER (OUTPATIENT)
Age: 68
End: 2023-09-13

## 2023-09-13 VITALS
OXYGEN SATURATION: 97 % | RESPIRATION RATE: 18 BRPM | DIASTOLIC BLOOD PRESSURE: 63 MMHG | TEMPERATURE: 99 F | SYSTOLIC BLOOD PRESSURE: 113 MMHG | HEART RATE: 79 BPM

## 2023-09-13 DIAGNOSIS — I35.0 NONRHEUMATIC AORTIC (VALVE) STENOSIS: ICD-10-CM

## 2023-09-13 DIAGNOSIS — R41.82 ALTERED MENTAL STATUS, UNSPECIFIED: ICD-10-CM

## 2023-09-13 DIAGNOSIS — R74.8 ABNORMAL LEVELS OF OTHER SERUM ENZYMES: ICD-10-CM

## 2023-09-13 DIAGNOSIS — I10 ESSENTIAL (PRIMARY) HYPERTENSION: ICD-10-CM

## 2023-09-13 DIAGNOSIS — I25.10 ATHEROSCLEROTIC HEART DISEASE OF NATIVE CORONARY ARTERY WITHOUT ANGINA PECTORIS: ICD-10-CM

## 2023-09-13 DIAGNOSIS — I48.20 CHRONIC ATRIAL FIBRILLATION, UNSPECIFIED: ICD-10-CM

## 2023-09-13 DIAGNOSIS — I48.91 UNSPECIFIED ATRIAL FIBRILLATION: ICD-10-CM

## 2023-09-13 DIAGNOSIS — Z79.01 LONG TERM (CURRENT) USE OF ANTICOAGULANTS: ICD-10-CM

## 2023-09-13 DIAGNOSIS — Z79.899 OTHER LONG TERM (CURRENT) DRUG THERAPY: ICD-10-CM

## 2023-09-13 DIAGNOSIS — I34.1 NONRHEUMATIC MITRAL (VALVE) PROLAPSE: ICD-10-CM

## 2023-09-13 LAB
A1C WITH ESTIMATED AVERAGE GLUCOSE RESULT: 5.6 % — SIGNIFICANT CHANGE UP (ref 4–5.6)
ALBUMIN SERPL ELPH-MCNC: 3.7 G/DL — SIGNIFICANT CHANGE UP (ref 3.5–5.2)
ALBUMIN SERPL ELPH-MCNC: 3.7 G/DL — SIGNIFICANT CHANGE UP (ref 3.5–5.2)
ALP SERPL-CCNC: 86 U/L — SIGNIFICANT CHANGE UP (ref 30–115)
ALP SERPL-CCNC: 90 U/L — SIGNIFICANT CHANGE UP (ref 30–115)
ALT FLD-CCNC: 90 U/L — HIGH (ref 0–41)
ALT FLD-CCNC: 96 U/L — HIGH (ref 0–41)
AMMONIA BLD-MCNC: 36 UMOL/L — SIGNIFICANT CHANGE UP (ref 11–55)
AMPHET UR-MCNC: NEGATIVE — SIGNIFICANT CHANGE UP
ANION GAP SERPL CALC-SCNC: 12 MMOL/L — SIGNIFICANT CHANGE UP (ref 7–14)
ANION GAP SERPL CALC-SCNC: 13 MMOL/L — SIGNIFICANT CHANGE UP (ref 7–14)
APTT BLD: 36.6 SEC — SIGNIFICANT CHANGE UP (ref 27–39.2)
AST SERPL-CCNC: 30 U/L — SIGNIFICANT CHANGE UP (ref 0–41)
AST SERPL-CCNC: 37 U/L — SIGNIFICANT CHANGE UP (ref 0–41)
BARBITURATES UR SCN-MCNC: NEGATIVE — SIGNIFICANT CHANGE UP
BASOPHILS # BLD AUTO: 0.04 K/UL — SIGNIFICANT CHANGE UP (ref 0–0.2)
BASOPHILS # BLD AUTO: 0.06 K/UL — SIGNIFICANT CHANGE UP (ref 0–0.2)
BASOPHILS NFR BLD AUTO: 0.4 % — SIGNIFICANT CHANGE UP (ref 0–1)
BASOPHILS NFR BLD AUTO: 0.6 % — SIGNIFICANT CHANGE UP (ref 0–1)
BENZODIAZ UR-MCNC: NEGATIVE — SIGNIFICANT CHANGE UP
BILIRUB SERPL-MCNC: 0.4 MG/DL — SIGNIFICANT CHANGE UP (ref 0.2–1.2)
BILIRUB SERPL-MCNC: 0.4 MG/DL — SIGNIFICANT CHANGE UP (ref 0.2–1.2)
BLD GP AB SCN SERPL QL: SIGNIFICANT CHANGE UP
BUN SERPL-MCNC: 16 MG/DL — SIGNIFICANT CHANGE UP (ref 10–20)
BUN SERPL-MCNC: 17 MG/DL — SIGNIFICANT CHANGE UP (ref 10–20)
CALCIUM SERPL-MCNC: 8.8 MG/DL — SIGNIFICANT CHANGE UP (ref 8.4–10.5)
CALCIUM SERPL-MCNC: 9.1 MG/DL — SIGNIFICANT CHANGE UP (ref 8.4–10.5)
CHLORIDE SERPL-SCNC: 102 MMOL/L — SIGNIFICANT CHANGE UP (ref 98–110)
CHLORIDE SERPL-SCNC: 105 MMOL/L — SIGNIFICANT CHANGE UP (ref 98–110)
CO2 SERPL-SCNC: 21 MMOL/L — SIGNIFICANT CHANGE UP (ref 17–32)
CO2 SERPL-SCNC: 25 MMOL/L — SIGNIFICANT CHANGE UP (ref 17–32)
COCAINE METAB.OTHER UR-MCNC: NEGATIVE — SIGNIFICANT CHANGE UP
CREAT SERPL-MCNC: 0.8 MG/DL — SIGNIFICANT CHANGE UP (ref 0.7–1.5)
CREAT SERPL-MCNC: 1 MG/DL — SIGNIFICANT CHANGE UP (ref 0.7–1.5)
D DIMER BLD IA.RAPID-MCNC: 945 NG/ML DDU — HIGH
EGFR: 81 ML/MIN/1.73M2 — SIGNIFICANT CHANGE UP
EGFR: 96 ML/MIN/1.73M2 — SIGNIFICANT CHANGE UP
EOSINOPHIL # BLD AUTO: 0.54 K/UL — SIGNIFICANT CHANGE UP (ref 0–0.7)
EOSINOPHIL # BLD AUTO: 0.59 K/UL — SIGNIFICANT CHANGE UP (ref 0–0.7)
EOSINOPHIL NFR BLD AUTO: 5.9 % — SIGNIFICANT CHANGE UP (ref 0–8)
EOSINOPHIL NFR BLD AUTO: 6.3 % — SIGNIFICANT CHANGE UP (ref 0–8)
ESTIMATED AVERAGE GLUCOSE: 114 MG/DL — SIGNIFICANT CHANGE UP (ref 68–114)
FOLATE SERPL-MCNC: 10.6 NG/ML — SIGNIFICANT CHANGE UP
GLUCOSE SERPL-MCNC: 104 MG/DL — HIGH (ref 70–99)
GLUCOSE SERPL-MCNC: 165 MG/DL — HIGH (ref 70–99)
HCT VFR BLD CALC: 29.3 % — LOW (ref 42–52)
HCT VFR BLD CALC: 30.6 % — LOW (ref 42–52)
HCV AB S/CO SERPL IA: 0.04 COI — SIGNIFICANT CHANGE UP
HCV AB SERPL-IMP: SIGNIFICANT CHANGE UP
HGB BLD-MCNC: 9.4 G/DL — LOW (ref 14–18)
HGB BLD-MCNC: 9.8 G/DL — LOW (ref 14–18)
IMM GRANULOCYTES NFR BLD AUTO: 0.6 % — HIGH (ref 0.1–0.3)
IMM GRANULOCYTES NFR BLD AUTO: 0.8 % — HIGH (ref 0.1–0.3)
INR BLD: 2.14 RATIO — HIGH (ref 0.65–1.3)
INR BLD: 2.27 RATIO — HIGH (ref 0.65–1.3)
IRON SATN MFR SERPL: 13 % — LOW (ref 15–50)
IRON SATN MFR SERPL: 32 UG/DL — LOW (ref 35–150)
LYMPHOCYTES # BLD AUTO: 0.83 K/UL — LOW (ref 1.2–3.4)
LYMPHOCYTES # BLD AUTO: 0.87 K/UL — LOW (ref 1.2–3.4)
LYMPHOCYTES # BLD AUTO: 9.1 % — LOW (ref 20.5–51.1)
LYMPHOCYTES # BLD AUTO: 9.2 % — LOW (ref 20.5–51.1)
MAGNESIUM SERPL-MCNC: 2.1 MG/DL — SIGNIFICANT CHANGE UP (ref 1.8–2.4)
MAGNESIUM SERPL-MCNC: 2.1 MG/DL — SIGNIFICANT CHANGE UP (ref 1.8–2.4)
MCHC RBC-ENTMCNC: 27.6 PG — SIGNIFICANT CHANGE UP (ref 27–31)
MCHC RBC-ENTMCNC: 28.4 PG — SIGNIFICANT CHANGE UP (ref 27–31)
MCHC RBC-ENTMCNC: 32 G/DL — SIGNIFICANT CHANGE UP (ref 32–37)
MCHC RBC-ENTMCNC: 32.1 G/DL — SIGNIFICANT CHANGE UP (ref 32–37)
MCV RBC AUTO: 85.9 FL — SIGNIFICANT CHANGE UP (ref 80–94)
MCV RBC AUTO: 88.7 FL — SIGNIFICANT CHANGE UP (ref 80–94)
METHADONE UR-MCNC: NEGATIVE — SIGNIFICANT CHANGE UP
MONOCYTES # BLD AUTO: 0.53 K/UL — SIGNIFICANT CHANGE UP (ref 0.1–0.6)
MONOCYTES # BLD AUTO: 1.1 K/UL — HIGH (ref 0.1–0.6)
MONOCYTES NFR BLD AUTO: 12.1 % — HIGH (ref 1.7–9.3)
MONOCYTES NFR BLD AUTO: 5.6 % — SIGNIFICANT CHANGE UP (ref 1.7–9.3)
NEUTROPHILS # BLD AUTO: 6.53 K/UL — HIGH (ref 1.4–6.5)
NEUTROPHILS # BLD AUTO: 7.3 K/UL — HIGH (ref 1.4–6.5)
NEUTROPHILS NFR BLD AUTO: 71.9 % — SIGNIFICANT CHANGE UP (ref 42.2–75.2)
NEUTROPHILS NFR BLD AUTO: 77.5 % — HIGH (ref 42.2–75.2)
NRBC # BLD: 0 /100 WBCS — SIGNIFICANT CHANGE UP (ref 0–0)
NRBC # BLD: 0 /100 WBCS — SIGNIFICANT CHANGE UP (ref 0–0)
NT-PROBNP SERPL-SCNC: 1020 PG/ML — HIGH (ref 0–300)
OPIATES UR-MCNC: NEGATIVE — SIGNIFICANT CHANGE UP
PCP SPEC-MCNC: SIGNIFICANT CHANGE UP
PHOSPHATE SERPL-MCNC: 3.4 MG/DL — SIGNIFICANT CHANGE UP (ref 2.1–4.9)
PLATELET # BLD AUTO: 255 K/UL — SIGNIFICANT CHANGE UP (ref 130–400)
PLATELET # BLD AUTO: 272 K/UL — SIGNIFICANT CHANGE UP (ref 130–400)
PMV BLD: 9.7 FL — SIGNIFICANT CHANGE UP (ref 7.4–10.4)
PMV BLD: 9.8 FL — SIGNIFICANT CHANGE UP (ref 7.4–10.4)
POTASSIUM SERPL-MCNC: 4 MMOL/L — SIGNIFICANT CHANGE UP (ref 3.5–5)
POTASSIUM SERPL-MCNC: 4.5 MMOL/L — SIGNIFICANT CHANGE UP (ref 3.5–5)
POTASSIUM SERPL-SCNC: 4 MMOL/L — SIGNIFICANT CHANGE UP (ref 3.5–5)
POTASSIUM SERPL-SCNC: 4.5 MMOL/L — SIGNIFICANT CHANGE UP (ref 3.5–5)
PROPOXYPHENE QUALITATIVE URINE RESULT: NEGATIVE — SIGNIFICANT CHANGE UP
PROT SERPL-MCNC: 6 G/DL — SIGNIFICANT CHANGE UP (ref 6–8)
PROT SERPL-MCNC: 6.3 G/DL — SIGNIFICANT CHANGE UP (ref 6–8)
PROTHROM AB SERPL-ACNC: 25 SEC — HIGH (ref 9.95–12.87)
PROTHROM AB SERPL-ACNC: 26.6 SEC — HIGH (ref 9.95–12.87)
RBC # BLD: 3.41 M/UL — LOW (ref 4.7–6.1)
RBC # BLD: 3.45 M/UL — LOW (ref 4.7–6.1)
RBC # FLD: 13.6 % — SIGNIFICANT CHANGE UP (ref 11.5–14.5)
RBC # FLD: 13.7 % — SIGNIFICANT CHANGE UP (ref 11.5–14.5)
SODIUM SERPL-SCNC: 139 MMOL/L — SIGNIFICANT CHANGE UP (ref 135–146)
SODIUM SERPL-SCNC: 139 MMOL/L — SIGNIFICANT CHANGE UP (ref 135–146)
TIBC SERPL-MCNC: 245 UG/DL — SIGNIFICANT CHANGE UP (ref 220–430)
TROPONIN T SERPL-MCNC: 0.17 NG/ML — CRITICAL HIGH
TROPONIN T SERPL-MCNC: 0.18 NG/ML — CRITICAL HIGH
TSH SERPL-MCNC: 2.8 UIU/ML — SIGNIFICANT CHANGE UP (ref 0.27–4.2)
UIBC SERPL-MCNC: 213 UG/DL — SIGNIFICANT CHANGE UP (ref 110–370)
VIT B12 SERPL-MCNC: 436 PG/ML — SIGNIFICANT CHANGE UP (ref 232–1245)
WBC # BLD: 9.09 K/UL — SIGNIFICANT CHANGE UP (ref 4.8–10.8)
WBC # BLD: 9.43 K/UL — SIGNIFICANT CHANGE UP (ref 4.8–10.8)
WBC # FLD AUTO: 9.09 K/UL — SIGNIFICANT CHANGE UP (ref 4.8–10.8)
WBC # FLD AUTO: 9.43 K/UL — SIGNIFICANT CHANGE UP (ref 4.8–10.8)

## 2023-09-13 PROCEDURE — 99239 HOSP IP/OBS DSCHRG MGMT >30: CPT

## 2023-09-13 PROCEDURE — 95819 EEG AWAKE AND ASLEEP: CPT | Mod: 26

## 2023-09-13 RX ORDER — WARFARIN SODIUM 2.5 MG/1
3 TABLET ORAL ONCE
Refills: 0 | Status: DISCONTINUED | OUTPATIENT
Start: 2023-09-13 | End: 2023-09-13

## 2023-09-13 RX ORDER — FUROSEMIDE 40 MG
1 TABLET ORAL
Refills: 0 | DISCHARGE

## 2023-09-13 RX ORDER — AMIODARONE HYDROCHLORIDE 400 MG/1
200 TABLET ORAL EVERY 12 HOURS
Refills: 0 | Status: CANCELLED | OUTPATIENT
Start: 2023-09-16 | End: 2023-09-13

## 2023-09-13 RX ORDER — METOPROLOL TARTRATE 50 MG
1.5 TABLET ORAL
Refills: 0 | DISCHARGE

## 2023-09-13 RX ORDER — FAMOTIDINE 10 MG/ML
1 INJECTION INTRAVENOUS
Refills: 0 | DISCHARGE

## 2023-09-13 RX ORDER — FAMOTIDINE 10 MG/ML
20 INJECTION INTRAVENOUS
Refills: 0 | Status: DISCONTINUED | OUTPATIENT
Start: 2023-09-13 | End: 2023-09-13

## 2023-09-13 RX ORDER — ATORVASTATIN CALCIUM 80 MG/1
40 TABLET, FILM COATED ORAL AT BEDTIME
Refills: 0 | Status: DISCONTINUED | OUTPATIENT
Start: 2023-09-13 | End: 2023-09-13

## 2023-09-13 RX ORDER — WARFARIN SODIUM 2.5 MG/1
3 TABLET ORAL ONCE
Refills: 0 | Status: COMPLETED | OUTPATIENT
Start: 2023-09-13 | End: 2023-09-13

## 2023-09-13 RX ORDER — METOPROLOL TARTRATE 50 MG
25 TABLET ORAL ONCE
Refills: 0 | Status: COMPLETED | OUTPATIENT
Start: 2023-09-13 | End: 2023-09-13

## 2023-09-13 RX ORDER — TADALAFIL 10 MG/1
1 TABLET, FILM COATED ORAL
Refills: 0 | DISCHARGE

## 2023-09-13 RX ORDER — POTASSIUM BICARBONATE 978 MG/1
1 TABLET, EFFERVESCENT ORAL
Refills: 0 | DISCHARGE

## 2023-09-13 RX ORDER — FUROSEMIDE 40 MG
20 TABLET ORAL
Refills: 0 | Status: DISCONTINUED | OUTPATIENT
Start: 2023-09-13 | End: 2023-09-13

## 2023-09-13 RX ORDER — METHYLPREDNISOLONE 4 MG
500 TABLET ORAL DAILY
Refills: 0 | Status: DISCONTINUED | OUTPATIENT
Start: 2023-09-13 | End: 2023-09-13

## 2023-09-13 RX ORDER — AMIODARONE HYDROCHLORIDE 400 MG/1
400 TABLET ORAL EVERY 12 HOURS
Refills: 0 | Status: DISCONTINUED | OUTPATIENT
Start: 2023-09-13 | End: 2023-09-13

## 2023-09-13 RX ORDER — POTASSIUM BICARBONATE 978 MG/1
20 TABLET, EFFERVESCENT ORAL
Refills: 0 | Status: DISCONTINUED | OUTPATIENT
Start: 2023-09-13 | End: 2023-09-13

## 2023-09-13 RX ORDER — LOSARTAN POTASSIUM 100 MG/1
1 TABLET, FILM COATED ORAL
Refills: 0 | DISCHARGE

## 2023-09-13 RX ORDER — ASPIRIN/CALCIUM CARB/MAGNESIUM 324 MG
81 TABLET ORAL DAILY
Refills: 0 | Status: DISCONTINUED | OUTPATIENT
Start: 2023-09-13 | End: 2023-09-13

## 2023-09-13 RX ORDER — ENOXAPARIN SODIUM 100 MG/ML
90 INJECTION SUBCUTANEOUS EVERY 12 HOURS
Refills: 0 | Status: DISCONTINUED | OUTPATIENT
Start: 2023-09-13 | End: 2023-09-13

## 2023-09-13 RX ORDER — METOPROLOL TARTRATE 50 MG
5 TABLET ORAL ONCE
Refills: 0 | Status: COMPLETED | OUTPATIENT
Start: 2023-09-13 | End: 2023-09-13

## 2023-09-13 RX ORDER — ENOXAPARIN SODIUM 100 MG/ML
86 INJECTION SUBCUTANEOUS EVERY 12 HOURS
Refills: 0 | Status: DISCONTINUED | OUTPATIENT
Start: 2023-09-13 | End: 2023-09-13

## 2023-09-13 RX ORDER — MAGNESIUM HYDROXIDE 400 MG/1
1 TABLET, CHEWABLE ORAL
Refills: 0 | DISCHARGE

## 2023-09-13 RX ADMIN — FAMOTIDINE 20 MILLIGRAM(S): 10 INJECTION INTRAVENOUS at 05:35

## 2023-09-13 RX ADMIN — Medication 1 MILLIGRAM(S): at 03:00

## 2023-09-13 RX ADMIN — Medication 500 MILLIGRAM(S): at 13:07

## 2023-09-13 RX ADMIN — Medication 20 MILLIGRAM(S): at 05:35

## 2023-09-13 RX ADMIN — AMIODARONE HYDROCHLORIDE 400 MILLIGRAM(S): 400 TABLET ORAL at 05:32

## 2023-09-13 RX ADMIN — Medication 81 MILLIGRAM(S): at 13:07

## 2023-09-13 RX ADMIN — WARFARIN SODIUM 3 MILLIGRAM(S): 2.5 TABLET ORAL at 06:22

## 2023-09-13 NOTE — H&P ADULT - ASSESSMENT
66 yo man with a PMH of CAD s/p YOCASTA x2 pLAD (1/2020) & YOCASTA x3 RCA (4/2018), HTN, GERD, [place] on CPAP @ night, Severe AS, MVP, rheumatic fever, had recent (1 week ago, Sept 5) open heart surgery aortic valve replacement and mitral valve repair, and paroxysmal A.fib released from hospital on Sunday night.  On coumadin (3mg daily) and ASA was brought to the hospital for altered mental status.       #AMS under evaluation  #? Post-operation delirium/psychosis (sx started 1 day after hospital discharge)  #less likely infective or metabolic cause  - Per wife, pt had acute confusion, irrelevant talk, and william   - CT stroke - negative   - no truama or signs of infection   - f/u Utox, ammonia, RPR, b12, folate  - MRI w/wo if symptoms dont subside   - may need psych consult   - Neurology following       #s/p AVR and Mitral valve replacement (Sept 5)  #Severe AS and MVP  #Rheumatic fever  - surgery uneventful per wife  - pt developed a.fib after surgery  - discharged on  9/10; pt was ambulating upto 4 blocks without any complaints.   - warfarin 3mg QD - adjust based on INR (target  2.5-3.5)      #Elevated troponins   #h/o CAD s/p YOCASTA x2 pLAD (1/2020) & YOCASTA x3 RCA (4/2018),   - ecg - no obvious ischemia changes   - cath done recently with no new CAD lesions       #Paroxysmal A.fib in RVR   - HR elevated to 120-130's   - IV metoprolol given   - c/w amiodarone and metoprolol   - warfarin for AC (3mg daily home dose)      #Mild transaminitis  - possibly 2/2 amiodarone   - trend   - no h/o alcohol abuse        #Prostate ca - s/p radical prostatectomy  - no blood in urine   - no obstructive uropathy symptoms       #HTN  - controlled, monitor       #GERD,  - famotidine       #[place] on CPAP @ night      VTE ppx - warfarin  GI ppx - pantoprazole  Activity as tolerated  DASH diet  Full Code           66 yo man with a PMH of CAD s/p YOCASTA x2 pLAD (1/2020) & YOCASTA x3 RCA (4/2018), HTN, GERD, [place] on CPAP @ night, Severe AS, MVP, rheumatic fever, had recent (1 week ago, Sept 5) open heart surgery aortic valve replacement and mitral valve repair, and paroxysmal A.fib released from hospital on Sunday night.  On coumadin (3mg daily) and ASA was brought to the hospital for altered mental status.       #AMS under evaluation  #? Post-operation delirium/psychosis (sx started 1 day after hospital discharge)  #less likely infective or metabolic cause  - Per wife, pt had acute confusion, irrelevant talk, and william   - CT stroke - negative   - no truama or signs of infection   - f/u Utox, ammonia, RPR, b12, folate  - MRI w/wo if symptoms dont subside   - If spike fever or leukocytosis without obvious source, would consider CNS infection but less likely at this time.   - may need psych consult   - Neurology following       #s/p AVR and Mitral valve replacement (Sept 5)  #Severe AS and MVP  #Rheumatic fever  - surgery uneventful per wife  - pt developed a.fib after surgery  - discharged on  9/10; pt was ambulating upto 4 blocks without any complaints.   - warfarin 3mg QD - adjust based on INR (target  2.5-3.5)      #Elevated troponins   #h/o CAD s/p YOCASTA x2 pLAD (1/2020) & YOCASTA x3 RCA (4/2018),   - ecg - no obvious ischemia changes   - cath done recently with no new CAD lesions   - trop -0.21 -----> trend (likely post-sx elevation); pt doesn't seem to have any symptoms  - Cardio consult       #Paroxysmal A.fib in RVR   - HR elevated to 120-130's   - IV metoprolol given   - c/w amiodarone and metoprolol   - warfarin for AC (3mg daily home dose)      #Mild transaminitis  - possibly 2/2 amiodarone   - trend   - no h/o alcohol abuse        #Prostate ca - s/p radical prostatectomy  - no blood in urine   - no obstructive uropathy symptoms       #HTN  - controlled, monitor       #GERD,  - famotidine       #[place] on CPAP @ night      VTE ppx - warfarin  GI ppx - pantoprazole  Activity as tolerated  DASH diet  Full Code           68 yo man with a PMH of CAD s/p YOCASTA x2 pLAD (1/2020) & YOCASTA x3 RCA (4/2018), HTN, GERD, [place] on CPAP @ night, Severe AS, MVP, rheumatic fever, had recent (1 week ago, Sept 5) open heart surgery aortic valve replacement and mitral valve repair, and paroxysmal A.fib released from hospital on Sunday night.  On coumadin (3mg daily) and ASA was brought to the hospital for altered mental status.       #AMS under evaluation  #? Post-operation delirium/psychosis (sx started 1 day after hospital discharge)  #less likely infective or metabolic cause  - Per wife, pt had acute confusion, irrelevant talk, and william   - CT stroke - negative   - no truama or signs of infection   - f/u Utox, ammonia, RPR, b12, folate  - MRI w/wo if symptoms dont subside   - If spike fever or leukocytosis without obvious source, would consider CNS infection but less likely at this time.   - may need psych consult   - Neurology following       #s/p AVR and Mitral valve replacement (Sept 5)  #Severe AS and MVP  #Rheumatic fever  - surgery uneventful per wife  - pt developed a.fib after surgery  - discharged on  9/10; pt was ambulating upto 4 blocks without any complaints.   - warfarin 3mg QD - adjust based on INR (target  2.5-3.5)      #Elevated troponins   #h/o CAD s/p YOCASTA x2 pLAD (1/2020) & YOCASTA x3 RCA (4/2018),   - ecg - no obvious ischemia changes   - cath done recently with no new CAD lesions   - trop -0.21 -----> trend (likely post-sx elevation); pt doesn't seem to have any symptoms      #Paroxysmal A.fib in RVR   - HR elevated to 120-130's   - IV metoprolol given   - c/w amiodarone and metoprolol   - warfarin for AC (3mg daily home dose)      #Mild transaminitis  - possibly 2/2 amiodarone   - trend   - no h/o alcohol abuse        #Prostate ca - s/p radical prostatectomy  - no blood in urine   - no obstructive uropathy symptoms       #HTN  - controlled, monitor       #GERD,  - famotidine       #[place] on CPAP @ night      VTE ppx - warfarin  GI ppx - pantoprazole  Activity as tolerated  DASH diet  Full Code           68 yo man with a PMH of CAD s/p YOCASTA x2 pLAD (1/2020) & YOCASTA x3 RCA (4/2018), HTN, GERD, [place] on CPAP @ night, Severe AS, MVP, rheumatic fever, had recent (1 week ago, Sept 5) open heart surgery aortic valve replacement and mitral valve repair, and paroxysmal A.fib released from hospital on Sunday night.  On coumadin (3mg daily) and ASA was brought to the hospital for altered mental status.       #AMS under evaluation  #Catatonia  #? Post-operation delirium/psychosis (sx started 1 day after hospital discharge)  #less likely infective or metabolic cause  - Per wife, pt had acute confusion, irrelevant talk, and william   - CT stroke - negative   - no truama or signs of infection   - f/u Utox, ammonia, RPR, b12, folate  - MRI w/wo if symptoms dont subside   - If spike fever or leukocytosis without obvious source, would consider CNS infection but less likely at this time.   - may need psych consult   - Neurology following   - Stroke code called  for Catatonia   - Patient  has  Waxy Flexibility   - No response to verbal or  Painful stimuli  - 1mg IV Atavan given   - rEEG ordered      #s/p AVR and Mitral valve replacement (Sept 5)  #Severe AS and MVP  #Rheumatic fever  - surgery uneventful per wife  - pt developed a.fib after surgery  - discharged on  9/10; pt was ambulating upto 4 blocks without any complaints.   - warfarin 3mg QD - adjust based on INR (target  2.5-3.5); Lovenox AC as pt not taking orally  - pt is in NSR during interval exam      #Elevated troponins   #h/o CAD s/p YOCASTA x2 pLAD (1/2020) & YOCASTA x3 RCA (4/2018),   - ecg - no obvious ischemia changes   - cath done recently with no new CAD lesions   - trop -0.21 -----> trend (likely post-sx elevation); pt doesn't seem to have any symptoms      #Paroxysmal A.fib in RVR   - HR elevated to 120-130's   - IV metoprolol given   - c/w amiodarone and metoprolol   - warfarin for AC (3mg daily home dose); Lovenox AC as pt not taking orally      #Mild transaminitis  - possibly 2/2 amiodarone   - trend   - no h/o alcohol abuse        #Prostate ca - s/p radical prostatectomy  - no blood in urine   - no obstructive uropathy symptoms       #HTN  - controlled, monitor       #GERD,  - famotidine       #[place] on CPAP @ night      VTE ppx - warfarin; Lovenox AC as pt not taking orally  GI ppx - pantoprazole  Activity as tolerated  DASH diet  Full Code           66 yo man with a PMH of CAD s/p YOCASTA x2 pLAD (1/2020) & YOCASTA x3 RCA (4/2018), HTN, GERD, [place] on CPAP @ night, Severe AS, MVP, rheumatic fever, had recent (1 week ago, Sept 5) open heart surgery aortic valve replacement and mitral valve repair, and paroxysmal A.fib released from hospital on Sunday night.  On coumadin (3mg daily) and ASA was brought to the hospital for altered mental status.       #AMS under evaluation  #Catatonia  #? Post-operation delirium/psychosis (sx started 1 day after hospital discharge)  #less likely infective or metabolic cause  - Per wife, pt had acute confusion, irrelevant talk, and william   - CT stroke - negative   - no truama or signs of infection   - f/u Utox, ammonia, RPR, b12, folate  - MRI w/wo if symptoms dont subside   - If spike fever or leukocytosis without obvious source, would consider CNS infection but less likely at this time.   - may need psych consult   - Neurology following   - Stroke code called  for Catatonia   - Patient  has  Waxy Flexibility   - No response to verbal or  Painful stimuli  - 1mg IV Atavan given   - rEEG ordered      #s/p AVR and Mitral valve replacement (Sept 5)  #Severe AS and MVP  #Rheumatic fever  - surgery uneventful per wife  - pt developed a.fib after surgery  - discharged on  9/10; pt was ambulating upto 4 blocks without any complaints.   - warfarin 3mg QD - adjust based on INR (target  2.5-3.5); Lovenox AC as pt not taking orally  - pt is in NSR during interval exam      #Elevated troponins   #h/o CAD s/p YOCASTA x2 pLAD (1/2020) & YOCASTA x3 RCA (4/2018),   - ecg - no obvious ischemia changes   - cath done recently with no new CAD lesions   - trop -0.21 -----> trend (likely post-sx elevation); pt doesn't seem to have any symptoms      #Paroxysmal A.fib in RVR   - HR elevated to 120-130's   - IV metoprolol given   - c/w amiodarone and metoprolol   - warfarin for AC (3mg daily home dose);      #Mild transaminitis  - possibly 2/2 amiodarone   - trend   - no h/o alcohol abuse        #Prostate ca - s/p radical prostatectomy  - no blood in urine   - no obstructive uropathy symptoms       #HTN  - controlled, monitor       #GERD,  - famotidine       #[place] on CPAP @ night      VTE ppx - warfarin;  GI ppx - pantoprazole  Activity as tolerated  DASH diet  Full Code      Addendum: Pt was up and talking normally now. He was asking for his warfarin. Lovenox changed to warfarin stat.

## 2023-09-13 NOTE — DISCHARGE NOTE PROVIDER - PROVIDER TOKENS
Left a message   PROVIDER:[TOKEN:[[id number]],FOLLOWUP:[2 weeks]],PROVIDER:[TOKEN:[[id number]],FOLLOWUP:[1 week]]

## 2023-09-13 NOTE — PROGRESS NOTE ADULT - SUBJECTIVE AND OBJECTIVE BOX
INTERVAL HPI/OVERNIGHT EVENTS:    SUBJECTIVE: Patient seen and examined at bedside.     no cp, sob, abd pain, fever  no ha, dizziness, lightheadedness, syncope    OBJECTIVE:    VITAL SIGNS:  Vital Signs Last 24 Hrs  T(C): 37 (13 Sep 2023 07:30), Max: 37.4 (12 Sep 2023 15:48)  T(F): 98.6 (13 Sep 2023 07:30), Max: 99.4 (12 Sep 2023 15:48)  HR: 82 (13 Sep 2023 07:30) (82 - 135)  BP: 121/70 (13 Sep 2023 07:30) (109/64 - 181/88)  BP(mean): 82 (13 Sep 2023 00:05) (82 - 82)  RR: 18 (13 Sep 2023 07:30) (14 - 18)  SpO2: 99% (13 Sep 2023 07:30) (96% - 99%)    Parameters below as of 13 Sep 2023 07:30  Patient On (Oxygen Delivery Method): room air          PHYSICAL EXAM:    General: NAD  HEENT: NC/AT; PERRL, clear conjunctiva  Neck: supple  Respiratory: CTA b/l  Cardiovascular: +S1/S2; RRR  Abdomen: soft, NT/ND; +BS x4  Extremities: WWP, 2+ peripheral pulses b/l; no LE edema  Skin: normal color and turgor; no rash  Neurological:    MEDICATIONS:  MEDICATIONS  (STANDING):  aMIOdarone    Tablet 400 milliGRAM(s) Oral every 12 hours  aspirin enteric coated 81 milliGRAM(s) Oral daily  chlorhexidine 2% Cloths 1 Application(s) Topical <User Schedule>  famotidine    Tablet 20 milliGRAM(s) Oral two times a day  furosemide    Tablet 20 milliGRAM(s) Oral <User Schedule>  magnesium gluconate 500 milliGRAM(s) Oral daily    MEDICATIONS  (PRN):      ALLERGIES:  Allergies    No Known Allergies    Intolerances        LABS:                        9.8    9.43  )-----------( 272      ( 13 Sep 2023 07:07 )             30.6     Hemoglobin: 9.8 g/dL (09-13 @ 07:07)  Hemoglobin: 9.4 g/dL (09-13 @ 01:00)  Hemoglobin: 10.1 g/dL (09-12 @ 17:11)    CBC Full  -  ( 13 Sep 2023 07:07 )  WBC Count : 9.43 K/uL  RBC Count : 3.45 M/uL  Hemoglobin : 9.8 g/dL  Hematocrit : 30.6 %  Platelet Count - Automated : 272 K/uL  Mean Cell Volume : 88.7 fL  Mean Cell Hemoglobin : 28.4 pg  Mean Cell Hemoglobin Concentration : 32.0 g/dL  Auto Neutrophil # : 7.30 K/uL  Auto Lymphocyte # : 0.87 K/uL  Auto Monocyte # : 0.53 K/uL  Auto Eosinophil # : 0.59 K/uL  Auto Basophil # : 0.06 K/uL  Auto Neutrophil % : 77.5 %  Auto Lymphocyte % : 9.2 %  Auto Monocyte % : 5.6 %  Auto Eosinophil % : 6.3 %  Auto Basophil % : 0.6 %    09-13    139  |  102  |  17  ----------------------------<  165<H>  4.0   |  25  |  1.0    Ca    9.1      13 Sep 2023 07:07  Phos  3.4     09-13  Mg     2.1     09-13    TPro  6.3  /  Alb  3.7  /  TBili  0.4  /  DBili  x   /  AST  30  /  ALT  90<H>  /  AlkPhos  90  09-13    Creatinine Trend: 1.0<--, 0.8<--, 1.1<--  LIVER FUNCTIONS - ( 13 Sep 2023 07:07 )  Alb: 3.7 g/dL / Pro: 6.3 g/dL / ALK PHOS: 90 U/L / ALT: 90 U/L / AST: 30 U/L / GGT: x           PT/INR - ( 13 Sep 2023 07:07 )   PT: 25.00 sec;   INR: 2.14 ratio         PTT - ( 13 Sep 2023 03:00 )  PTT:36.6 sec    hs Troponin:            Urinalysis Basic - ( 13 Sep 2023 07:07 )    Color: x / Appearance: x / SG: x / pH: x  Gluc: 165 mg/dL / Ketone: x  / Bili: x / Urobili: x   Blood: x / Protein: x / Nitrite: x   Leuk Esterase: x / RBC: x / WBC x   Sq Epi: x / Non Sq Epi: x / Bacteria: x      CSF:                      EKG:   MICROBIOLOGY:    IMAGING:      Labs, imaging, EKG personally reviewed    RADIOLOGY & ADDITIONAL TESTS: Reviewed.

## 2023-09-13 NOTE — H&P ADULT - NSHPLABSRESULTS_GEN_ALL_CORE
10.1   10.12 )-----------( 272      ( 12 Sep 2023 17:11 )             30.6     09-12    139  |  101  |  21<H>  ----------------------------<  162<H>  4.6   |  23  |  1.1    Ca    9.2      12 Sep 2023 17:11    TPro  6.6  /  Alb  3.8  /  TBili  0.4  /  DBili  x   /  AST  52<H>  /  ALT  118<H>  /  AlkPhos  94  09-12    PT/INR - ( 12 Sep 2023 17:11 )   PT: 29.80 sec;   INR: 2.54 ratio         PTT - ( 12 Sep 2023 17:11 )  PTT:36.9 sec  Urinalysis Basic - ( 12 Sep 2023 20:30 )    Color: Yellow / Appearance: Clear / SG: >1.030 / pH: x  Gluc: x / Ketone: Negative mg/dL  / Bili: Negative / Urobili: 1.0 mg/dL   Blood: x / Protein: Trace mg/dL / Nitrite: Negative   Leuk Esterase: Negative / RBC: x / WBC x   Sq Epi: x / Non Sq Epi: x / Bacteria: x      RADIOLOGY, EKG & ADDITIONAL TESTS: Reviewed.

## 2023-09-13 NOTE — CHART NOTE - NSCHARTNOTEFT_GEN_A_CORE
Stroke code was activated in ed 3 for unresponsiveness. Waxy -flexibility of extremities noted with bush Joshua catatonia score of 18. Stroke code cancelled by medical team. Recommendations as per Neurology consult note, can add REEG.

## 2023-09-13 NOTE — H&P ADULT - NSHPPHYSICALEXAM_GEN_ALL_CORE
GENERAL: Pt not willing to talk, appears well built.   HEAD:  Atraumatic, Normocephalic  EYES: EOMI, PERRLA, conjunctiva and sclera clear  ENMT: No tonsillar erythema, exudates, or enlargement; Moist mucous membranes  NECK: Supple, No JVD, Normal thyroid  HEART: irregular elevated rate and rhythm; No murmurs, rubs, or gallops  RESPIRATORY: CTA B/L, No W/R/R  ABDOMEN: Soft, Nontender, Nondistended; Bowel sounds present  NEUROLOGY: Pt was awake with eyes closed. Wouldn't respond to call.    EXTREMITIES:  2+ Peripheral Pulses, No clubbing, cyanosis, or edema  SKIN: warm, dry, normal color, no rash or abnormal lesions

## 2023-09-13 NOTE — PROGRESS NOTE ADULT - NSPROGADDITIONALINFOA_GEN_ALL_CORE
#Progress Note Handoff:  Pending (specify):  Consults_________, Tests________, Test Results_______, Other____mri vs. transfer to [facility]_____  Family discussion: d/w pt, wife at bedside  Disposition: Home___/SNF___/Other________/Unknown at this time____x____

## 2023-09-13 NOTE — EEG REPORT - NS EEG TEXT BOX
[place] Department of Neurology  Inpatient Routine-EEG Report      Patient Name:	NAVEEN DAVIS    :	1955  MRN:	-  Study Date/Time:	2023, 4:11:52 AM  Referred by:	-    Brief Clinical History:  NAVEEN DAVIS is a 67 year old Male; study performed to investigate for seizures or markers of epilepsy.   Diagnosis Code: R40.4 Transient alteration of awareness    Patient Medication:  ATIVAN    PACERONC    PEPCID    LASIX    LOPRESSOR    COUMADIN      Acquisition Details:  Electroencephalography was acquired using a minimum of 21 channels on an [employer] system v 9.3.1 with electrode placement according to the standard International 10-20 system following ACNS (American Clinical Neurophysiology Society) guidelines.  Anterior temporal T1 and T2 electrodes were utilized whenever possible.   The [employer] automated spike & seizure detections were all reviewed in detail, in addition to the entire raw EEG.    Findings:  Background:  continuous.   Voltage:  Normal (20uV)  Organization:  Appropriate anterior-posterior gradient  Posterior Dominant Rhythm:  9 Hz symmetric, well-organized, and well-modulated  Variability:  Yes	Reactivity:  Yes  Sleep:  Symmetric spindles.  Focal abnormalities:  No persistent asymmetries of voltage or frequency.  Interictal Activity:  None  Focal Slowing:  None  Generalized Slowing:  Mild  Events:  1)	No electrographic seizures or significant clinical events.  Provocations:  1)	Hyperventilation: was not performed.  2)	Photic stimulation: was not performed.  Impression:  Abnormal due to generalized slowing as above    Clinical Correlation:  Findings consistent with mild diffuse electrocerebral dysfunction secondary to nonspecific etiology    Scott Irving MD  Attending Neurologist, Division of Epilepsy

## 2023-09-13 NOTE — H&P ADULT - ATTENDING COMMENTS
68 yo man with a PMH of CAD s/p YOCASTA x2 pLAD (1/2020) & YOCASTA x3 RCA (4/2018), HTN, GERD, [place] on CPAP @ night, Severe AS, MVP, rheumatic fever, had recent (1 week ago, Sept 5) open heart surgery aortic valve replacement and mitral valve repair, and paroxysmal A.fib released from hospital on Sunday night.  On coumadin (3mg daily) and ASA was brought to the hospital for altered mental status.     Agree  with assessment  except for changes below.     CT PERFUSION:  No identified region of core infarct. Scattered bilateral regions of   delayed perfusion are seen measuring 47 mL.    CTA HEAD/NECK:  No large vessel occlusion, aneurysm, or vascular malformation.  Mild stenosis of the proximal left internal carotid artery due to   atherosclerotic calcification.    IMPRESSION  AMS    DDx: Post-operation delirium/psychosis (sx started 1 day after hospital discharge)  less likely infective   No meningeal  Sings   R/O  Metabolic  Encephalopathy   Per wife, pt had acute confusion, irrelevant talk, and william   CT stroke - negative   No truama or signs of infection   f/u Utox, ammonia, RPR, b12, folate   MRI w/wo if symptoms don't subside, consider LP  If spike fever or leukocytosis without obvious source, would consider CNS infection but less likely at this time.   may need psych consult   Neurology following     Recent AVR and Mitrawl valve replacement (Sept 5)  Severe AS and MVP  Rheumatic fever  Surgery uneventful per wife  Pt developed a.fib after surgery  Discharged on  9/10; pt was ambulating upto 4 blocks without any complaints.   Warfarin 3mg QD - adjust based on INR (target  2.5-3.5)  Daily INR         Hx Prostate ca - s/p radical prostatectomy  - Not on  Flomax of 5 olivia reductase   - no blood in urine   - no obstructive uropathy symptoms       Hx HTN  - controlled, monitor     Hx GERD,  - famotidine       #[place] on CPAP @     Hx Paroxysmal A.fib in RVR   - HR elevated to 120-130's   - IV metoprolol given   - c/w amiodarone and metoprolol   - warfarin for AC (3mg daily home dose)     Mild transaminitis  Possibly 2/2 amiodarone   trend  LFts   Call  OP  Primary  to   establish baseline  LFTs   - no h/o alcohol abuse 68 yo man with a PMH of CAD s/p YOCASTA x2 pLAD (1/2020) & YOCASTA x3 RCA (4/2018), HTN, GERD, [place] on CPAP @ night, Severe AS, MVP, rheumatic fever, had recent (1 week ago, Sept 5) open heart surgery aortic valve replacement and mitral valve repair, and paroxysmal A.fib released from hospital on Sunday night.  On coumadin (3mg daily) and ASA was brought to the hospital for altered mental status.     Agree  with assessment  except for changes below.     CT PERFUSION:  No identified region of core infarct. Scattered bilateral regions of   delayed perfusion are seen measuring 47 mL.    Vital Signs (24 Hrs):  T(C): 37.4 (09-13-23 @ 00:05), Max: 37.4 (09-12-23 @ 15:48)  HR: 132 (09-13-23 @ 00:05) (96 - 135)  BP: 109/64 (09-13-23 @ 00:05) (109/64 - 181/88)  RR: 18 (09-12-23 @ 20:14) (14 - 18)  SpO2: 96% (09-13-23 @ 00:05) (96% - 98%)  Daily Height in cm: 177.8 (12 Sep 2023 15:36)        12 Sep 2023 07:01  -  13 Sep 2023 02:13  --------------------------------------------------------  IN: 0 mL / OUT: 400 mL / NET: -400 mL        CTA HEAD/NECK:  No large vessel occlusion, aneurysm, or vascular malformation.  Mild stenosis of the proximal left internal carotid artery due to   atherosclerotic calcification.    IMPRESSION  AMS    DDx: Post-operation delirium/psychosis (sx started 1 day after hospital discharge)  less likely infective   No meningeal  Sings   R/O  Metabolic  Encephalopathy   Per wife, pt had acute confusion, irrelevant talk, and william   CT stroke - negative   No truama or signs of infection   f/u Utox, ammonia, RPR, b12, folate   MRI w/wo if symptoms don't subside  If spike fever or leukocytosis without obvious source, would consider CNS infection but less likely at this time.  consider LP  Neurology following     Recent AVR and Mitrawl valve replacement (Sept 5)  Severe AS and MVP  Rheumatic fever  Surgery uneventful per wife  Pt developed a.fib after surgery  Discharged on  9/10; pt was ambulating upto 4 blocks without any complaints.   Warfarin 3mg QD - adjust based on INR (target  2.5-3.5)  Daily INR       Hx Prostate ca - s/p radical prostatectomy  - Not on  Flomax of 5 olivia reductase   - no blood in urine   - no obstructive uropathy symptoms     Anemia Normocytic - Possible secondary to resent Surgery     Hx HTN  - controlled, monitor     Hx GERD,  - famotidine     Hx [place] on CPAP @     Hx Paroxysmal A.fib in RVR   - HR elevated to 120-130's   - IV metoprolol given   - restart  home  amiodarone and metoprolol   - warfarin for AC (3mg daily home dose)  - tele  Moniting      Mild transaminitis  Possibly 2/2 amiodarone   trend  LFts  Hold  Statin    Call  OP  Primary  to   establish baseline  LFTs   - no h/o alcohol abuse 68 yo man with a PMH of CAD s/p YOCASTA x2 pLAD (1/2020) & YOCASTA x3 RCA (4/2018), HTN, GERD, [place] on CPAP @ night, Severe AS, MVP, rheumatic fever, had recent (1 week ago, Sept 5) open heart surgery aortic valve replacement and mitral valve repair, and paroxysmal A.fib released from hospital on Sunday night.  On coumadin (3mg daily) and ASA was brought to the hospital for altered mental status.     Agree  with assessment  except for changes below.     CT PERFUSION:  No identified region of core infarct. Scattered bilateral regions of   delayed perfusion are seen measuring 47 mL.  CTA HEAD/NECK:  No large vessel occlusion, aneurysm, or vascular malformation.  Mild stenosis of the proximal left internal carotid artery due to   atherosclerotic calcification.    Vital Signs (24 Hrs):  T(C): 37.4 (09-13-23 @ 00:05), Max: 37.4 (09-12-23 @ 15:48)  HR: 104 (09-13-23 @ 00:05) (96 - 135)  BP: 109/64 (09-13-23 @ 00:05) (109/64 - 181/88)  RR: 18 (09-12-23 @ 20:14) (14 - 18)  SpO2: 96% (09-13-23 @ 00:05) (96% - 98%)  Daily Height in cm: 177.8 (12 Sep 2023 15:36)      PHYSICAL EXAM  GENERAL: NAD,  HEAD:  NCAT, EOMI, MM  NECK: Supple, Nontender  NERVOUS SYSTEM:  AAOx0, waxy Flexibility   CHEST/LUNG: +bs b/l, No wheezing   HEART: +s1s2 Regular RR  ABDOMEN: soft, NT/ND  EXTREMITIES:  pp, no edema  SKIN: age related skin changes     IMPRESSION  AMS   Catatonic  State   Metabolic  Encephalopathy   DDx: Seizure   less likely infective   No meningeal  Sings   Per wife, Symptoms started  with  acute confusion, irrelevant talk, and william   CT stroke - negative   No truama or signs of infection   f/u Utox, ammonia, RPR, b12, folate  f/u MRI w/wo  LP  If spike fever or leukocytosis without obvious source, would consider CNS infection but less likely at this time.    Neurology following     Stroke code called  for [place]at[place]   Patient  has  Waxy Flexibility   No response to verbal or  Painful stimuli   1mg IV Atavan given   ordering Stat VEEG    Neurology  Recommends  Psych  Eval     Recent AVR and Mitrawl valve replacement (Sept 5)  Bioprostetic  Severe AS and MVP  Rheumatic fever  Surgery uneventful per wife  Pt developed a.fib after surgery  Discharged on  9/10; pt was ambulating upto 4 blocks without any complaints.   Warfarin 3mg QD - adjust based on INR (target  2.5-3.5)  Daily INR   Can  switch to Lovenox after repeat INR to  continue  AC  Please reach out to  CT Surg  team in  AM     Hx Prostate ca - s/p radical prostatectomy  - Not on  Flomax of 5 olivia reductase   - no blood in urine   - no obstructive uropathy symptoms     Anemia Normocytic - Possible secondary to resent Surgery     Hx HTN  - controlled, monitor     Hx GERD,  - famotidine     Hx [place] on CPAP @     Hx Paroxysmal A.fib in RVR   - HR elevated to 120-130's   - IV metoprolol given   - restart  home  amiodarone and metoprolol   - warfarin for AC (3mg daily home dose)  - tele  Moniting   - can switch to Lovenox while not taking PO    Mild transaminitis  Possibly 2/2 amiodarone   trend  LFts  Hold  Statin    Call  OP  Primary  to   establish baseline  LFTs   - no h/o alcohol abuse  Seen on 09/12/23 68 yo man with a PMH of CAD s/p YOCASTA x2 pLAD (1/2020) & YOCASTA x3 RCA (4/2018), HTN, GERD, [place] on CPAP @ night, Severe AS, MVP, rheumatic fever, had recent (1 week ago, Sept 5) open heart surgery aortic valve replacement and mitral valve repair, and paroxysmal A.fib released from hospital on Sunday night.  On coumadin (3mg daily) and ASA was brought to the hospital for altered mental status.     Agree  with assessment  except for changes below.     CT PERFUSION:  No identified region of core infarct. Scattered bilateral regions of   delayed perfusion are seen measuring 47 mL.  CTA HEAD/NECK:  No large vessel occlusion, aneurysm, or vascular malformation.  Mild stenosis of the proximal left internal carotid artery due to   atherosclerotic calcification.    Vital Signs (24 Hrs):  T(C): 37.4 (09-13-23 @ 00:05), Max: 37.4 (09-12-23 @ 15:48)  HR: 104 (09-13-23 @ 00:05) (96 - 135)  BP: 109/64 (09-13-23 @ 00:05) (109/64 - 181/88)  RR: 18 (09-12-23 @ 20:14) (14 - 18)  SpO2: 96% (09-13-23 @ 00:05) (96% - 98%)  Daily Height in cm: 177.8 (12 Sep 2023 15:36)      PHYSICAL EXAM  GENERAL: NAD,  HEAD:  NCAT, EOMI, MM  NECK: Supple, Nontender  NERVOUS SYSTEM:  AAOx0, waxy Flexibility   CHEST/LUNG: +bs b/l, No wheezing   HEART: +s1s2 Regular RR  ABDOMEN: soft, NT/ND  EXTREMITIES:  pp, no edema  SKIN: age related skin changes     IMPRESSION  AMS   Catatonic  State   Metabolic  Encephalopathy   DDx: Seizure   less likely infective   No meningeal  Sings   Per wife, Symptoms started  with  acute confusion, irrelevant talk, and william   CT stroke - negative   No truama or signs of infection   f/u Utox, ammonia, RPR, b12, folate  f/u MRI w/wo  LP  If spike fever or leukocytosis without obvious source, would consider CNS infection but less likely at this time.    Neurology following   Psych Eval     Stroke code called  for [place]   Patient  has  Waxy Flexibility   No response to verbal or  Painful stimuli   1mg IV Atavan given   ordering Stat VEEG    Neurology  Recommends  Psych  Eval     Symptoms resolved  after  f/u in 1 hour  no post ictal state  Patients states that he was aware for the entire  Event?      Recent AVR and Mitrawl valve replacement (Sept 5)  Bioprostetic  Severe AS and MVP  Rheumatic fever  Surgery uneventful per wife  Pt developed a.fib after surgery  Discharged on  9/10; pt was ambulating upto 4 blocks without any complaints.   Warfarin 3mg QD - adjust based on INR (target  2.5-3.5)  Daily INR   Can  switch to Lovenox after repeat INR to  continue  AC  Please reach out to  CT Surg  team in  AM     Hx Prostate ca - s/p radical prostatectomy  - Not on  Flomax of 5 olivia reductase   - no blood in urine   - no obstructive uropathy symptoms   Anemia Normocytic - Possible secondary to resent Surgery   Hx HTN- controlled, monitor   Hx GERD,- famotidine   Hx [place] on CPAP @   Hx Paroxysmal A.fib in RVR   - HR elevated to 120-130's   - IV metoprolol given   - restart  home  amiodarone and metoprolol   - warfarin for AC (3mg daily home dose)  - tele  Moniting   - can switch to Lovenox while not taking PO    Mild transaminitis  Possibly 2/2 amiodarone   trend  LFts  Hold  Statin    Call  OP  Primary  to   establish baseline  LFTs   - no h/o alcohol abuse  Seen on 09/12/23

## 2023-09-13 NOTE — PROGRESS NOTE ADULT - ASSESSMENT
67M PMHx CAD s/p YOCASTA to pLAD 1/2020, YOCASTA to RCA 4/2018; HTN, severe AS s/p SAVR 9/2023, MVP s/p repair 9/2023, postop AFib 9/2023 on temporary coumadin here with altered mental status.

## 2023-09-13 NOTE — H&P ADULT - HISTORY OF PRESENT ILLNESS
History is given by wife Phyllis ([phone])    68 yo man with a PMH of CAD s/p YOCASTA x2 pLAD (1/2020) & YOCASTA x3 RCA (4/2018), HTN, GERD, [place] on CPAP @ night, Severe AS, MVP, rheumatic fever, had recent (1 week ago, Sept 5) open heart surgery aortic valve replacement and mitral valve repair, and paroxysmal A.fib released from hospital on Sunday night.  On coumadin (3mg daily) and ASA was brought to the hospital for altered mental status.     On Monday evening, pt started speaking nonsensically.was forgetful and making odd jokes. He slept that night and  woke up normal. However, since, afternoon today(9/12), he had sudden complete personality change and was worse than the previous night. Wife desribes the symptoms as manic, exaggerated responses, sarcasm and irrelevant talk. Brought in to ED, stroke code was called. On eval NIHSS 3 because he was not answering questions (believed he was god, was sarcastic) and only followed one command. CT stroke protocol was negative for acute findings. Neuro advised work up for metabolic and infective causes now. Psych consult. Pt apparently had no complaints or symptoms prior to the episode. He had no fever, chestpain, shortness of breath, N/V, abdominal pain, urinary or bowel complaints.     Pt has no past history of psychiatric illness. No h/o drug abuse       In ED - BP - 181/88; HR - 98 /min; RR - 14 /min; SPO2 -99%@RA; FSBG 87,   ecg - afib with RVR  labs - WBC -10k; Hb -10; creat-1.1/BUN 21; AST/ALT - 52/118; INR - 2.5; rest     Patient admitted for AMS eval.

## 2023-09-13 NOTE — DISCHARGE NOTE PROVIDER - NSDCMRMEDTOKEN_GEN_ALL_CORE_FT
amiodarone 100 mg oral tablet: 1 tab(s) orally 2 times a day  Aspirin Enteric Coated 81 mg oral delayed release tablet: 1 tab(s) orally once a day  famotidine 20 mg oral tablet: 1 tab(s) orally 2 times a day  Lasix 20 mg oral tablet: 1 tab(s) orally 5 times a week  magnesium hydroxide 400 mg oral tablet, chewable: 1 tab(s) orally 5 times a week  Metoprolol Succinate ER 25 mg oral tablet, extended release: 1.5 tab(s) orally once a day  potassium bicarbonate 20 mEq oral tablet, effervescent: 1 tab(s) orally 5 times a week  rosuvastatin 10 mg oral tablet: 1 tab(s) orally once a day

## 2023-09-13 NOTE — PROGRESS NOTE ADULT - PROBLEM SELECTOR PLAN 1
post discharge from [place]; now near resolved  suspect delirium  cth neg  cta/ perfusion with delayed perfusion bl  reeg neg  family requesting transfer to [place]  mri to be done at [place] per family preference  case d/w CTS np at [place]; awaiting callback  appreciate neuro

## 2023-09-13 NOTE — DISCHARGE NOTE PROVIDER - NSDCFUSCHEDAPPT_GEN_ALL_CORE_FT
[facility] PreAdmits  Scheduled Appointment: 09/15/2023    [facility]  Ezekiel Bingham  Scheduled Appointment: 09/15/2023

## 2023-09-13 NOTE — DISCHARGE NOTE PROVIDER - ATTENDING DISCHARGE PHYSICAL EXAMINATION:
neurology recommended MRI which pt/ family refused; requested to obtain as outpt; mental status has returned to near baseline per family

## 2023-09-13 NOTE — DISCHARGE NOTE PROVIDER - CARE PROVIDER_API CALL
Charu Singer  Internal Medicine  [address] [place] [id number]  Phone: [phone]  Fax: [phone]  Follow Up Time: 2 weeks    NERY ABEBE  [facility] [address] [place] [address] [id number]  [place] [address]  Phone: ()-  Fax: ()-  Follow Up Time: 1 week

## 2023-09-13 NOTE — DISCHARGE NOTE NURSING/CASE MANAGEMENT/SOCIAL WORK - PATIENT PORTAL LINK FT
You can access the FollowMyHealth Patient Portal offered by [facility] by registering at the following website: [email or web address]. By joining [facility]’s FollowMyHealth portal, you will also be able to view your health information using other applications (apps) compatible with our system.

## 2023-09-13 NOTE — EEG REPORT - NS EEG TEXT BOX
[facility] Department of Neurology  Inpatient Routine-EEG Report      Patient Name:	NAVEEN DAVIS    :	1955  MRN:	[id number]  Study Date/Time:	2023, 4:11:52 AM  Referred by:	-    Brief Clinical History:  NAVEEN DAVIS is a 67 year old Male; study performed to investigate for seizures or markers of epilepsy.   Diagnosis Code: R40.4 Transient alteration of awareness    Patient Medication:  ATIVAN    PACERONC    PEPCID    LASIX    LOPRESSOR    COUMADIN      Acquisition Details:  Electroencephalography was acquired using a minimum of 21 channels on an [employer] system v 9.3.1 with electrode placement according to the standard International 10-20 system following ACNS (American Clinical Neurophysiology Society) guidelines.  Anterior temporal T1 and T2 electrodes were utilized whenever possible.   The [employer] automated spike & seizure detections were all reviewed in detail, in addition to the entire raw EEG.    Findings:  Background:  continuous.   Voltage:  Normal (20uV)  Organization:  Appropriate anterior-posterior gradient  Posterior Dominant Rhythm:  9 Hz symmetric, well-organized, and well-modulated  Variability:  Yes	Reactivity:  Yes  Sleep:  Symmetric spindles.  Focal abnormalities:  No persistent asymmetries of voltage or frequency.  Interictal Activity:  None  Focal Slowing:  None  Generalized Slowing:  Mild  Events:  1)	No electrographic seizures or significant clinical events.  Provocations:  1)	Hyperventilation: was not performed.  2)	Photic stimulation: was not performed.  Impression:  Abnormal due to generalized slowing as above    Clinical Correlation:  Findings consistent with mild diffuse electrocerebral dysfunction secondary to nonspecific etiology    Scott Irving MD  Attending Neurologist, Division of Epilepsy

## 2023-09-13 NOTE — DISCHARGE NOTE PROVIDER - HOSPITAL COURSE
Reason for Admission: Altered Mental Status  History of Present Illness:   History is given by wife Phyllis ([phone])    68 yo man with a PMH of CAD s/p YOCASTA x2 pLAD (1/2020) & YOCASTA x3 RCA (4/2018), HTN, GERD, [place] on CPAP @ night, Severe AS, MVP, rheumatic fever, had recent (1 week ago, Sept 5) open heart surgery aortic valve replacement and mitral valve repair, and paroxysmal A.fib released from hospital on Sunday night.  On coumadin (3mg daily) and ASA was brought to the hospital for altered mental status.     On Monday evening, pt started speaking nonsensically.was forgetful and making odd jokes. He slept that night and  woke up normal. However, since, afternoon today(9/12), he had sudden complete personality change and was worse than the previous night. Wife desribes the symptoms as manic, exaggerated responses, sarcasm and irrelevant talk. Brought in to ED, stroke code was called. On eval NIHSS 3 because he was not answering questions (believed he was god, was sarcastic) and only followed one command. CT stroke protocol was negative for acute findings. Neuro advised work up for metabolic and infective causes now. Psych consult. Pt apparently had no complaints or symptoms prior to the episode. He had no fever, chestpain, shortness of breath, N/V, abdominal pain, urinary or bowel complaints.     Pt has no past history of psychiatric illness. No h/o drug abuse       In ED - BP - 181/88; HR - 98 /min; RR - 14 /min; SPO2 -99%@RA; FSBG 87,   ecg - afib with RVR  labs - WBC -10k; Hb -10; creat-1.1/BUN 21; AST/ALT - 52/118; INR - 2.5; rest     Patient admitted for AMS eval.     In the ED, stroke code called and imaging is as follows.     CT PERFUSION:  No identified region of core infarct. Scattered bilateral regions of   delayed perfusion are seen measuring 47 mL.    CTA HEAD/NECK:  No large vessel occlusion, aneurysm, or vascular malformation.    Mild stenosis of the proximal left internal carotid artery due to   atherosclerotic calcification.    Bilateral pleural effusions.     On 9/13, Patient is AAOx3 but not completely at baseline per pt's wife. Pt was offered MRI for further neurological evaluation but he declined. Pt's troponins have been elevated but are trending down. Pt and his wife are requesting for discharge and is in understanding about possible risks about the same.

## 2023-09-13 NOTE — DISCHARGE NOTE PROVIDER - NSDCCPCAREPLAN_GEN_ALL_CORE_FT
PRINCIPAL DISCHARGE DIAGNOSIS  Diagnosis: Altered mental status  Assessment and Plan of Treatment: Altered mental status (AMS) is a disruption in how your brain works that causes a change in behavior. This change can happen suddenly or over days. AMS ranges from slight confusion to total disorientation and increased sleepiness to coma.  Antibiotics help fight or prevent infection. Take your antibiotics until they are gone, even if you feel better.  Take your medicine as directed.   Contact your healthcare provider if:  You have sudden changes in behavior.  You are more sleepy or confused than usual.  You have questions or concerns about your condition or care.  Your speech is slurred or you are rambling.  You have a seizure.  You are not able to move any part of your body freely.  Someone close to you cannot wake you up.  You were worked up for a possible stroke, but initial imaging with CT showed no stroke. You were offered MRI and further neuro evaluation, but you declined. You will need to follow up with your neurologist for the same.         SECONDARY DISCHARGE DIAGNOSES  Diagnosis: Elevated troponin  Assessment and Plan of Treatment: Your troponins were elevated, You will need a follow up with your cardiologist for the same in 2 weeks time.    Diagnosis: Atrial fibrillation and flutter  Assessment and Plan of Treatment:     Diagnosis: Delusions  Assessment and Plan of Treatment:

## 2023-09-14 LAB
HIV 1+2 AB+HIV1 P24 AG SERPL QL IA: SIGNIFICANT CHANGE UP
T PALLIDUM AB TITR SER: NEGATIVE — SIGNIFICANT CHANGE UP

## 2023-09-15 ENCOUNTER — APPOINTMENT (OUTPATIENT)
Dept: MEDICATION MANAGEMENT | Facility: CLINIC | Age: 68
End: 2023-09-15

## 2023-09-15 ENCOUNTER — OUTPATIENT (OUTPATIENT)
Dept: OUTPATIENT SERVICES | Facility: HOSPITAL | Age: 68
LOS: 1 days | End: 2023-09-15
Payer: MEDICARE

## 2023-09-15 DIAGNOSIS — I48.91 UNSPECIFIED ATRIAL FIBRILLATION: ICD-10-CM

## 2023-09-15 DIAGNOSIS — Z79.01 LONG TERM (CURRENT) USE OF ANTICOAGULANTS: ICD-10-CM

## 2023-09-15 DIAGNOSIS — Z90.5 ACQUIRED ABSENCE OF KIDNEY: Chronic | ICD-10-CM

## 2023-09-15 LAB
INR PPP: 1.4 RATIO
POCT-PROTHROMBIN TIME: 16.5 SECS
QUALITY CONTROL: YES

## 2023-09-15 PROCEDURE — 99211 OFF/OP EST MAY X REQ PHY/QHP: CPT

## 2023-09-15 PROCEDURE — 36416 COLLJ CAPILLARY BLOOD SPEC: CPT

## 2023-09-15 PROCEDURE — 85610 PROTHROMBIN TIME: CPT

## 2023-09-16 DIAGNOSIS — I48.91 UNSPECIFIED ATRIAL FIBRILLATION: ICD-10-CM

## 2023-09-16 DIAGNOSIS — Z79.01 LONG TERM (CURRENT) USE OF ANTICOAGULANTS: ICD-10-CM

## 2023-09-16 RX ORDER — AMIODARONE HYDROCHLORIDE 400 MG/1
1 TABLET ORAL
Refills: 0 | DISCHARGE
Start: 2023-09-16

## 2023-09-18 ENCOUNTER — OUTPATIENT (OUTPATIENT)
Dept: OUTPATIENT SERVICES | Facility: HOSPITAL | Age: 68
LOS: 1 days | End: 2023-09-18
Payer: MEDICARE

## 2023-09-18 ENCOUNTER — APPOINTMENT (OUTPATIENT)
Dept: MEDICATION MANAGEMENT | Facility: CLINIC | Age: 68
End: 2023-09-18

## 2023-09-18 DIAGNOSIS — I48.91 UNSPECIFIED ATRIAL FIBRILLATION: ICD-10-CM

## 2023-09-18 DIAGNOSIS — Z79.01 LONG TERM (CURRENT) USE OF ANTICOAGULANTS: ICD-10-CM

## 2023-09-18 DIAGNOSIS — Z90.79 ACQUIRED ABSENCE OF OTHER GENITAL ORGAN(S): Chronic | ICD-10-CM

## 2023-09-18 LAB
CULTURE RESULTS: SIGNIFICANT CHANGE UP
CULTURE RESULTS: SIGNIFICANT CHANGE UP
INR PPP: 1.4 RATIO
POCT-PROTHROMBIN TIME: 16.9 SECS
SPECIMEN SOURCE: SIGNIFICANT CHANGE UP
SPECIMEN SOURCE: SIGNIFICANT CHANGE UP

## 2023-09-18 PROCEDURE — 36416 COLLJ CAPILLARY BLOOD SPEC: CPT

## 2023-09-18 PROCEDURE — 85610 PROTHROMBIN TIME: CPT

## 2023-09-18 PROCEDURE — 99211 OFF/OP EST MAY X REQ PHY/QHP: CPT

## 2023-09-19 DIAGNOSIS — Z79.01 LONG TERM (CURRENT) USE OF ANTICOAGULANTS: ICD-10-CM

## 2023-09-19 DIAGNOSIS — I48.91 UNSPECIFIED ATRIAL FIBRILLATION: ICD-10-CM

## 2023-09-21 ENCOUNTER — OUTPATIENT (OUTPATIENT)
Dept: OUTPATIENT SERVICES | Facility: HOSPITAL | Age: 68
LOS: 1 days | End: 2023-09-21
Payer: MEDICARE

## 2023-09-21 ENCOUNTER — APPOINTMENT (OUTPATIENT)
Dept: MEDICATION MANAGEMENT | Facility: CLINIC | Age: 68
End: 2023-09-21

## 2023-09-21 DIAGNOSIS — Z90.79 ACQUIRED ABSENCE OF OTHER GENITAL ORGAN(S): Chronic | ICD-10-CM

## 2023-09-21 DIAGNOSIS — Z79.01 LONG TERM (CURRENT) USE OF ANTICOAGULANTS: ICD-10-CM

## 2023-09-21 DIAGNOSIS — I48.91 UNSPECIFIED ATRIAL FIBRILLATION: ICD-10-CM

## 2023-09-21 DIAGNOSIS — Z90.5 ACQUIRED ABSENCE OF KIDNEY: Chronic | ICD-10-CM

## 2023-09-21 LAB
INR PPP: 1.4 RATIO
POCT-PROTHROMBIN TIME: 16.7 SECS
QUALITY CONTROL: YES

## 2023-09-21 PROCEDURE — 85610 PROTHROMBIN TIME: CPT

## 2023-09-21 PROCEDURE — 99211 OFF/OP EST MAY X REQ PHY/QHP: CPT

## 2023-09-21 PROCEDURE — 36416 COLLJ CAPILLARY BLOOD SPEC: CPT

## 2023-09-21 RX ORDER — WARFARIN 1 MG/1
1 TABLET ORAL
Qty: 450 | Refills: 0 | Status: ACTIVE | COMMUNITY
Start: 2023-09-21 | End: 1900-01-01

## 2023-09-22 DIAGNOSIS — Z79.01 LONG TERM (CURRENT) USE OF ANTICOAGULANTS: ICD-10-CM

## 2023-09-22 DIAGNOSIS — I48.91 UNSPECIFIED ATRIAL FIBRILLATION: ICD-10-CM

## 2023-09-25 ENCOUNTER — APPOINTMENT (OUTPATIENT)
Dept: MEDICATION MANAGEMENT | Facility: CLINIC | Age: 68
End: 2023-09-25

## 2023-09-25 ENCOUNTER — OUTPATIENT (OUTPATIENT)
Dept: OUTPATIENT SERVICES | Facility: HOSPITAL | Age: 68
LOS: 1 days | End: 2023-09-25
Payer: MEDICARE

## 2023-09-25 DIAGNOSIS — I48.20 CHRONIC ATRIAL FIBRILLATION, UNSPECIFIED: ICD-10-CM

## 2023-09-25 DIAGNOSIS — Z95.5 PRESENCE OF CORONARY ANGIOPLASTY IMPLANT AND GRAFT: ICD-10-CM

## 2023-09-25 DIAGNOSIS — Z79.01 LONG TERM (CURRENT) USE OF ANTICOAGULANTS: ICD-10-CM

## 2023-09-25 DIAGNOSIS — Z95.3 PRESENCE OF XENOGENIC HEART VALVE: ICD-10-CM

## 2023-09-25 DIAGNOSIS — Z79.82 LONG TERM (CURRENT) USE OF ASPIRIN: ICD-10-CM

## 2023-09-25 DIAGNOSIS — K21.9 GASTRO-ESOPHAGEAL REFLUX DISEASE WITHOUT ESOPHAGITIS: ICD-10-CM

## 2023-09-25 DIAGNOSIS — G47.33 OBSTRUCTIVE SLEEP APNEA (ADULT) (PEDIATRIC): ICD-10-CM

## 2023-09-25 DIAGNOSIS — Z90.5 ACQUIRED ABSENCE OF KIDNEY: Chronic | ICD-10-CM

## 2023-09-25 DIAGNOSIS — I48.92 UNSPECIFIED ATRIAL FLUTTER: ICD-10-CM

## 2023-09-25 DIAGNOSIS — R41.0 DISORIENTATION, UNSPECIFIED: ICD-10-CM

## 2023-09-25 DIAGNOSIS — G93.89 OTHER SPECIFIED DISORDERS OF BRAIN: ICD-10-CM

## 2023-09-25 DIAGNOSIS — R29.810 FACIAL WEAKNESS: ICD-10-CM

## 2023-09-25 DIAGNOSIS — Z85.46 PERSONAL HISTORY OF MALIGNANT NEOPLASM OF PROSTATE: ICD-10-CM

## 2023-09-25 DIAGNOSIS — F06.1 CATATONIC DISORDER DUE TO KNOWN PHYSIOLOGICAL CONDITION: ICD-10-CM

## 2023-09-25 DIAGNOSIS — I25.10 ATHEROSCLEROTIC HEART DISEASE OF NATIVE CORONARY ARTERY WITHOUT ANGINA PECTORIS: ICD-10-CM

## 2023-09-25 DIAGNOSIS — F22 DELUSIONAL DISORDERS: ICD-10-CM

## 2023-09-25 DIAGNOSIS — R74.01 ELEVATION OF LEVELS OF LIVER TRANSAMINASE LEVELS: ICD-10-CM

## 2023-09-25 DIAGNOSIS — I48.91 UNSPECIFIED ATRIAL FIBRILLATION: ICD-10-CM

## 2023-09-25 DIAGNOSIS — G93.40 ENCEPHALOPATHY, UNSPECIFIED: ICD-10-CM

## 2023-09-25 DIAGNOSIS — I10 ESSENTIAL (PRIMARY) HYPERTENSION: ICD-10-CM

## 2023-09-25 DIAGNOSIS — Z90.79 ACQUIRED ABSENCE OF OTHER GENITAL ORGAN(S): Chronic | ICD-10-CM

## 2023-09-25 DIAGNOSIS — Z99.89 DEPENDENCE ON OTHER ENABLING MACHINES AND DEVICES: ICD-10-CM

## 2023-09-25 DIAGNOSIS — G93.41 METABOLIC ENCEPHALOPATHY: ICD-10-CM

## 2023-09-25 LAB
INR PPP: 1.4 RATIO
POCT-PROTHROMBIN TIME: 16.5 SECS
QUALITY CONTROL: YES

## 2023-09-25 PROCEDURE — 99211 OFF/OP EST MAY X REQ PHY/QHP: CPT

## 2023-09-25 PROCEDURE — 85610 PROTHROMBIN TIME: CPT

## 2023-09-25 PROCEDURE — 36416 COLLJ CAPILLARY BLOOD SPEC: CPT

## 2023-09-26 DIAGNOSIS — I48.91 UNSPECIFIED ATRIAL FIBRILLATION: ICD-10-CM

## 2023-09-26 DIAGNOSIS — Z79.01 LONG TERM (CURRENT) USE OF ANTICOAGULANTS: ICD-10-CM

## 2023-09-28 ENCOUNTER — APPOINTMENT (OUTPATIENT)
Dept: MEDICATION MANAGEMENT | Facility: CLINIC | Age: 68
End: 2023-09-28

## 2023-09-28 ENCOUNTER — OUTPATIENT (OUTPATIENT)
Dept: OUTPATIENT SERVICES | Facility: HOSPITAL | Age: 68
LOS: 1 days | End: 2023-09-28
Payer: MEDICARE

## 2023-09-28 DIAGNOSIS — Z90.5 ACQUIRED ABSENCE OF KIDNEY: Chronic | ICD-10-CM

## 2023-09-28 DIAGNOSIS — I48.91 UNSPECIFIED ATRIAL FIBRILLATION: ICD-10-CM

## 2023-09-28 DIAGNOSIS — Z79.01 LONG TERM (CURRENT) USE OF ANTICOAGULANTS: ICD-10-CM

## 2023-09-28 DIAGNOSIS — Z90.79 ACQUIRED ABSENCE OF OTHER GENITAL ORGAN(S): Chronic | ICD-10-CM

## 2023-09-28 LAB
INR PPP: 1.4 RATIO
POCT-PROTHROMBIN TIME: 16.8 SECS
QUALITY CONTROL: YES

## 2023-09-28 PROCEDURE — 99211 OFF/OP EST MAY X REQ PHY/QHP: CPT

## 2023-09-28 PROCEDURE — 85610 PROTHROMBIN TIME: CPT

## 2023-09-28 PROCEDURE — 36416 COLLJ CAPILLARY BLOOD SPEC: CPT

## 2023-09-29 DIAGNOSIS — Z79.01 LONG TERM (CURRENT) USE OF ANTICOAGULANTS: ICD-10-CM

## 2023-09-29 DIAGNOSIS — I48.91 UNSPECIFIED ATRIAL FIBRILLATION: ICD-10-CM

## 2023-09-29 LAB
INR PPP: 1.38 RATIO
PT BLD: 15.9 SEC

## 2023-10-02 ENCOUNTER — OUTPATIENT (OUTPATIENT)
Dept: OUTPATIENT SERVICES | Facility: HOSPITAL | Age: 68
LOS: 1 days | End: 2023-10-02
Payer: MEDICARE

## 2023-10-02 ENCOUNTER — APPOINTMENT (OUTPATIENT)
Dept: MEDICATION MANAGEMENT | Facility: CLINIC | Age: 68
End: 2023-10-02

## 2023-10-02 DIAGNOSIS — Z79.01 LONG TERM (CURRENT) USE OF ANTICOAGULANTS: ICD-10-CM

## 2023-10-02 DIAGNOSIS — Z90.79 ACQUIRED ABSENCE OF OTHER GENITAL ORGAN(S): Chronic | ICD-10-CM

## 2023-10-02 DIAGNOSIS — Z90.5 ACQUIRED ABSENCE OF KIDNEY: Chronic | ICD-10-CM

## 2023-10-02 DIAGNOSIS — I48.91 UNSPECIFIED ATRIAL FIBRILLATION: ICD-10-CM

## 2023-10-02 LAB
INR PPP: 2.1 RATIO
POCT-PROTHROMBIN TIME: 24.6 SECS
QUALITY CONTROL: YES

## 2023-10-02 PROCEDURE — 85610 PROTHROMBIN TIME: CPT

## 2023-10-02 PROCEDURE — 36416 COLLJ CAPILLARY BLOOD SPEC: CPT

## 2023-10-02 PROCEDURE — 99211 OFF/OP EST MAY X REQ PHY/QHP: CPT

## 2023-10-03 DIAGNOSIS — I48.91 UNSPECIFIED ATRIAL FIBRILLATION: ICD-10-CM

## 2023-10-03 DIAGNOSIS — Z79.01 LONG TERM (CURRENT) USE OF ANTICOAGULANTS: ICD-10-CM

## 2023-10-06 ENCOUNTER — OUTPATIENT (OUTPATIENT)
Dept: OUTPATIENT SERVICES | Facility: HOSPITAL | Age: 68
LOS: 1 days | End: 2023-10-06
Payer: MEDICARE

## 2023-10-06 ENCOUNTER — APPOINTMENT (OUTPATIENT)
Dept: MEDICATION MANAGEMENT | Facility: CLINIC | Age: 68
End: 2023-10-06

## 2023-10-06 DIAGNOSIS — Z79.01 LONG TERM (CURRENT) USE OF ANTICOAGULANTS: ICD-10-CM

## 2023-10-06 DIAGNOSIS — Z90.5 ACQUIRED ABSENCE OF KIDNEY: Chronic | ICD-10-CM

## 2023-10-06 DIAGNOSIS — I48.91 UNSPECIFIED ATRIAL FIBRILLATION: ICD-10-CM

## 2023-10-06 DIAGNOSIS — Z90.79 ACQUIRED ABSENCE OF OTHER GENITAL ORGAN(S): Chronic | ICD-10-CM

## 2023-10-06 LAB
INR PPP: 2.3 RATIO
POCT-PROTHROMBIN TIME: 27.5 SECS
QUALITY CONTROL: YES

## 2023-10-06 PROCEDURE — 85610 PROTHROMBIN TIME: CPT

## 2023-10-06 PROCEDURE — 99211 OFF/OP EST MAY X REQ PHY/QHP: CPT

## 2023-10-06 PROCEDURE — 36416 COLLJ CAPILLARY BLOOD SPEC: CPT

## 2023-10-07 DIAGNOSIS — I48.91 UNSPECIFIED ATRIAL FIBRILLATION: ICD-10-CM

## 2023-10-07 DIAGNOSIS — Z79.01 LONG TERM (CURRENT) USE OF ANTICOAGULANTS: ICD-10-CM

## 2023-10-12 ENCOUNTER — OUTPATIENT (OUTPATIENT)
Dept: OUTPATIENT SERVICES | Facility: HOSPITAL | Age: 68
LOS: 1 days | End: 2023-10-12
Payer: MEDICARE

## 2023-10-12 ENCOUNTER — APPOINTMENT (OUTPATIENT)
Dept: MEDICATION MANAGEMENT | Facility: CLINIC | Age: 68
End: 2023-10-12

## 2023-10-12 DIAGNOSIS — Z79.01 LONG TERM (CURRENT) USE OF ANTICOAGULANTS: ICD-10-CM

## 2023-10-12 DIAGNOSIS — Z90.5 ACQUIRED ABSENCE OF KIDNEY: Chronic | ICD-10-CM

## 2023-10-12 DIAGNOSIS — I48.91 UNSPECIFIED ATRIAL FIBRILLATION: ICD-10-CM

## 2023-10-12 DIAGNOSIS — Z90.79 ACQUIRED ABSENCE OF OTHER GENITAL ORGAN(S): Chronic | ICD-10-CM

## 2023-10-12 LAB
INR PPP: 2.1 RATIO
POCT-PROTHROMBIN TIME: 24.8 SECS
QUALITY CONTROL: YES

## 2023-10-12 PROCEDURE — 85610 PROTHROMBIN TIME: CPT

## 2023-10-12 PROCEDURE — 99211 OFF/OP EST MAY X REQ PHY/QHP: CPT

## 2023-10-12 PROCEDURE — 36416 COLLJ CAPILLARY BLOOD SPEC: CPT

## 2023-10-13 DIAGNOSIS — Z79.01 LONG TERM (CURRENT) USE OF ANTICOAGULANTS: ICD-10-CM

## 2023-10-13 DIAGNOSIS — I48.91 UNSPECIFIED ATRIAL FIBRILLATION: ICD-10-CM

## 2023-10-19 ENCOUNTER — APPOINTMENT (OUTPATIENT)
Dept: MEDICATION MANAGEMENT | Facility: CLINIC | Age: 68
End: 2023-10-19

## 2023-10-19 ENCOUNTER — OUTPATIENT (OUTPATIENT)
Dept: OUTPATIENT SERVICES | Facility: HOSPITAL | Age: 68
LOS: 1 days | End: 2023-10-19
Payer: MEDICARE

## 2023-10-19 DIAGNOSIS — Z79.01 LONG TERM (CURRENT) USE OF ANTICOAGULANTS: ICD-10-CM

## 2023-10-19 DIAGNOSIS — I48.91 UNSPECIFIED ATRIAL FIBRILLATION: ICD-10-CM

## 2023-10-19 LAB
INR PPP: 2.1 RATIO
POCT-PROTHROMBIN TIME: 24.7 SECS
QUALITY CONTROL: YES

## 2023-10-19 PROCEDURE — 85610 PROTHROMBIN TIME: CPT

## 2023-10-19 PROCEDURE — 99211 OFF/OP EST MAY X REQ PHY/QHP: CPT

## 2023-10-19 PROCEDURE — 36416 COLLJ CAPILLARY BLOOD SPEC: CPT

## 2023-10-20 DIAGNOSIS — I48.91 UNSPECIFIED ATRIAL FIBRILLATION: ICD-10-CM

## 2023-10-20 DIAGNOSIS — Z79.01 LONG TERM (CURRENT) USE OF ANTICOAGULANTS: ICD-10-CM

## 2023-10-23 ENCOUNTER — APPOINTMENT (OUTPATIENT)
Age: 68
End: 2023-10-23

## 2023-10-23 ENCOUNTER — OUTPATIENT (OUTPATIENT)
Dept: OUTPATIENT SERVICES | Facility: HOSPITAL | Age: 68
LOS: 1 days | End: 2023-10-23
Payer: MEDICARE

## 2023-10-23 DIAGNOSIS — B87.9 MYIASIS, UNSPECIFIED: ICD-10-CM

## 2023-10-23 DIAGNOSIS — Z90.5 ACQUIRED ABSENCE OF KIDNEY: Chronic | ICD-10-CM

## 2023-10-23 DIAGNOSIS — I35.1 NONRHEUMATIC AORTIC (VALVE) INSUFFICIENCY: ICD-10-CM

## 2023-10-23 DIAGNOSIS — I35.0 NONRHEUMATIC AORTIC (VALVE) STENOSIS: ICD-10-CM

## 2023-10-23 PROCEDURE — 93798 PHYS/QHP OP CAR RHAB W/ECG: CPT

## 2023-10-24 DIAGNOSIS — I35.1 NONRHEUMATIC AORTIC (VALVE) INSUFFICIENCY: ICD-10-CM

## 2023-10-24 DIAGNOSIS — I35.0 NONRHEUMATIC AORTIC (VALVE) STENOSIS: ICD-10-CM

## 2023-10-25 ENCOUNTER — APPOINTMENT (OUTPATIENT)
Age: 68
End: 2023-10-25

## 2023-10-25 ENCOUNTER — OUTPATIENT (OUTPATIENT)
Dept: OUTPATIENT SERVICES | Facility: HOSPITAL | Age: 68
LOS: 1 days | End: 2023-10-25
Payer: MEDICARE

## 2023-10-25 DIAGNOSIS — Z90.79 ACQUIRED ABSENCE OF OTHER GENITAL ORGAN(S): Chronic | ICD-10-CM

## 2023-10-25 DIAGNOSIS — Z90.5 ACQUIRED ABSENCE OF KIDNEY: Chronic | ICD-10-CM

## 2023-10-25 DIAGNOSIS — I35.1 NONRHEUMATIC AORTIC (VALVE) INSUFFICIENCY: ICD-10-CM

## 2023-10-25 DIAGNOSIS — I35.0 NONRHEUMATIC AORTIC (VALVE) STENOSIS: ICD-10-CM

## 2023-10-25 PROCEDURE — 93798 PHYS/QHP OP CAR RHAB W/ECG: CPT

## 2023-10-26 ENCOUNTER — APPOINTMENT (OUTPATIENT)
Dept: MEDICATION MANAGEMENT | Facility: CLINIC | Age: 68
End: 2023-10-26

## 2023-10-26 ENCOUNTER — OUTPATIENT (OUTPATIENT)
Dept: OUTPATIENT SERVICES | Facility: HOSPITAL | Age: 68
LOS: 1 days | End: 2023-10-26
Payer: MEDICARE

## 2023-10-26 DIAGNOSIS — I35.1 NONRHEUMATIC AORTIC (VALVE) INSUFFICIENCY: ICD-10-CM

## 2023-10-26 DIAGNOSIS — Z90.79 ACQUIRED ABSENCE OF OTHER GENITAL ORGAN(S): Chronic | ICD-10-CM

## 2023-10-26 DIAGNOSIS — I35.0 NONRHEUMATIC AORTIC (VALVE) STENOSIS: ICD-10-CM

## 2023-10-26 DIAGNOSIS — Z79.01 LONG TERM (CURRENT) USE OF ANTICOAGULANTS: ICD-10-CM

## 2023-10-26 DIAGNOSIS — I48.91 UNSPECIFIED ATRIAL FIBRILLATION: ICD-10-CM

## 2023-10-26 LAB
INR PPP: 2 RATIO
POCT-PROTHROMBIN TIME: 23.5 SECS
QUALITY CONTROL: YES

## 2023-10-26 PROCEDURE — 85610 PROTHROMBIN TIME: CPT

## 2023-10-26 PROCEDURE — 36416 COLLJ CAPILLARY BLOOD SPEC: CPT

## 2023-10-26 PROCEDURE — 99211 OFF/OP EST MAY X REQ PHY/QHP: CPT

## 2023-10-27 ENCOUNTER — OUTPATIENT (OUTPATIENT)
Dept: OUTPATIENT SERVICES | Facility: HOSPITAL | Age: 68
LOS: 1 days | End: 2023-10-27

## 2023-10-27 ENCOUNTER — APPOINTMENT (OUTPATIENT)
Age: 68
End: 2023-10-27

## 2023-10-27 DIAGNOSIS — Z90.5 ACQUIRED ABSENCE OF KIDNEY: Chronic | ICD-10-CM

## 2023-10-27 DIAGNOSIS — Z90.79 ACQUIRED ABSENCE OF OTHER GENITAL ORGAN(S): Chronic | ICD-10-CM

## 2023-10-27 DIAGNOSIS — I48.91 UNSPECIFIED ATRIAL FIBRILLATION: ICD-10-CM

## 2023-10-27 DIAGNOSIS — Z79.01 LONG TERM (CURRENT) USE OF ANTICOAGULANTS: ICD-10-CM

## 2023-10-27 DIAGNOSIS — I35.1 NONRHEUMATIC AORTIC (VALVE) INSUFFICIENCY: ICD-10-CM

## 2023-10-27 DIAGNOSIS — I35.0 NONRHEUMATIC AORTIC (VALVE) STENOSIS: ICD-10-CM

## 2023-10-30 ENCOUNTER — APPOINTMENT (OUTPATIENT)
Age: 68
End: 2023-10-30

## 2023-11-01 ENCOUNTER — OUTPATIENT (OUTPATIENT)
Dept: OUTPATIENT SERVICES | Facility: HOSPITAL | Age: 68
LOS: 1 days | End: 2023-11-01
Payer: MEDICARE

## 2023-11-01 ENCOUNTER — APPOINTMENT (OUTPATIENT)
Age: 68
End: 2023-11-01

## 2023-11-01 DIAGNOSIS — I35.0 NONRHEUMATIC AORTIC (VALVE) STENOSIS: ICD-10-CM

## 2023-11-01 DIAGNOSIS — Z90.5 ACQUIRED ABSENCE OF KIDNEY: Chronic | ICD-10-CM

## 2023-11-01 DIAGNOSIS — Z90.79 ACQUIRED ABSENCE OF OTHER GENITAL ORGAN(S): Chronic | ICD-10-CM

## 2023-11-01 DIAGNOSIS — I35.1 NONRHEUMATIC AORTIC (VALVE) INSUFFICIENCY: ICD-10-CM

## 2023-11-01 PROCEDURE — 93798 PHYS/QHP OP CAR RHAB W/ECG: CPT

## 2023-11-02 DIAGNOSIS — I35.0 NONRHEUMATIC AORTIC (VALVE) STENOSIS: ICD-10-CM

## 2023-11-02 DIAGNOSIS — I35.1 NONRHEUMATIC AORTIC (VALVE) INSUFFICIENCY: ICD-10-CM

## 2023-11-03 ENCOUNTER — APPOINTMENT (OUTPATIENT)
Age: 68
End: 2023-11-03

## 2023-11-03 ENCOUNTER — OUTPATIENT (OUTPATIENT)
Dept: OUTPATIENT SERVICES | Facility: HOSPITAL | Age: 68
LOS: 1 days | End: 2023-11-03

## 2023-11-03 DIAGNOSIS — I35.1 NONRHEUMATIC AORTIC (VALVE) INSUFFICIENCY: ICD-10-CM

## 2023-11-03 DIAGNOSIS — Z90.5 ACQUIRED ABSENCE OF KIDNEY: Chronic | ICD-10-CM

## 2023-11-03 DIAGNOSIS — Z90.79 ACQUIRED ABSENCE OF OTHER GENITAL ORGAN(S): Chronic | ICD-10-CM

## 2023-11-03 DIAGNOSIS — I35.0 NONRHEUMATIC AORTIC (VALVE) STENOSIS: ICD-10-CM

## 2023-11-06 ENCOUNTER — OUTPATIENT (OUTPATIENT)
Dept: OUTPATIENT SERVICES | Facility: HOSPITAL | Age: 68
LOS: 1 days | End: 2023-11-06

## 2023-11-06 ENCOUNTER — APPOINTMENT (OUTPATIENT)
Age: 68
End: 2023-11-06

## 2023-11-06 DIAGNOSIS — Z90.5 ACQUIRED ABSENCE OF KIDNEY: Chronic | ICD-10-CM

## 2023-11-06 DIAGNOSIS — I35.0 NONRHEUMATIC AORTIC (VALVE) STENOSIS: ICD-10-CM

## 2023-11-06 DIAGNOSIS — Z90.79 ACQUIRED ABSENCE OF OTHER GENITAL ORGAN(S): Chronic | ICD-10-CM

## 2023-11-06 DIAGNOSIS — I35.1 NONRHEUMATIC AORTIC (VALVE) INSUFFICIENCY: ICD-10-CM

## 2023-11-07 ENCOUNTER — APPOINTMENT (OUTPATIENT)
Dept: MEDICATION MANAGEMENT | Facility: CLINIC | Age: 68
End: 2023-11-07

## 2023-11-08 ENCOUNTER — APPOINTMENT (OUTPATIENT)
Age: 68
End: 2023-11-08

## 2023-11-08 ENCOUNTER — OUTPATIENT (OUTPATIENT)
Dept: OUTPATIENT SERVICES | Facility: HOSPITAL | Age: 68
LOS: 1 days | End: 2023-11-08

## 2023-11-08 DIAGNOSIS — Z90.5 ACQUIRED ABSENCE OF KIDNEY: Chronic | ICD-10-CM

## 2023-11-08 DIAGNOSIS — I35.1 NONRHEUMATIC AORTIC (VALVE) INSUFFICIENCY: ICD-10-CM

## 2023-11-08 DIAGNOSIS — I35.0 NONRHEUMATIC AORTIC (VALVE) STENOSIS: ICD-10-CM

## 2023-11-08 DIAGNOSIS — Z90.79 ACQUIRED ABSENCE OF OTHER GENITAL ORGAN(S): Chronic | ICD-10-CM

## 2023-11-09 ENCOUNTER — OUTPATIENT (OUTPATIENT)
Dept: OUTPATIENT SERVICES | Facility: HOSPITAL | Age: 68
LOS: 1 days | End: 2023-11-09
Payer: MEDICARE

## 2023-11-09 ENCOUNTER — APPOINTMENT (OUTPATIENT)
Dept: MEDICATION MANAGEMENT | Facility: CLINIC | Age: 68
End: 2023-11-09

## 2023-11-09 DIAGNOSIS — I48.91 UNSPECIFIED ATRIAL FIBRILLATION: ICD-10-CM

## 2023-11-09 DIAGNOSIS — Z90.5 ACQUIRED ABSENCE OF KIDNEY: Chronic | ICD-10-CM

## 2023-11-09 DIAGNOSIS — Z79.01 LONG TERM (CURRENT) USE OF ANTICOAGULANTS: ICD-10-CM

## 2023-11-09 DIAGNOSIS — Z90.79 ACQUIRED ABSENCE OF OTHER GENITAL ORGAN(S): Chronic | ICD-10-CM

## 2023-11-09 LAB
INR PPP: 1.8 RATIO
POCT-PROTHROMBIN TIME: 22.2 SECS
QUALITY CONTROL: YES

## 2023-11-09 PROCEDURE — 85610 PROTHROMBIN TIME: CPT

## 2023-11-09 PROCEDURE — 99211 OFF/OP EST MAY X REQ PHY/QHP: CPT

## 2023-11-09 PROCEDURE — 36416 COLLJ CAPILLARY BLOOD SPEC: CPT

## 2023-11-10 ENCOUNTER — APPOINTMENT (OUTPATIENT)
Age: 68
End: 2023-11-10

## 2023-11-10 ENCOUNTER — OUTPATIENT (OUTPATIENT)
Dept: OUTPATIENT SERVICES | Facility: HOSPITAL | Age: 68
LOS: 1 days | End: 2023-11-10

## 2023-11-10 DIAGNOSIS — I48.91 UNSPECIFIED ATRIAL FIBRILLATION: ICD-10-CM

## 2023-11-10 DIAGNOSIS — Z90.5 ACQUIRED ABSENCE OF KIDNEY: Chronic | ICD-10-CM

## 2023-11-10 DIAGNOSIS — I35.0 NONRHEUMATIC AORTIC (VALVE) STENOSIS: ICD-10-CM

## 2023-11-10 DIAGNOSIS — I35.1 NONRHEUMATIC AORTIC (VALVE) INSUFFICIENCY: ICD-10-CM

## 2023-11-10 DIAGNOSIS — Z79.01 LONG TERM (CURRENT) USE OF ANTICOAGULANTS: ICD-10-CM

## 2023-11-10 DIAGNOSIS — Z90.79 ACQUIRED ABSENCE OF OTHER GENITAL ORGAN(S): Chronic | ICD-10-CM

## 2023-11-13 ENCOUNTER — APPOINTMENT (OUTPATIENT)
Age: 68
End: 2023-11-13

## 2023-11-13 ENCOUNTER — OUTPATIENT (OUTPATIENT)
Dept: OUTPATIENT SERVICES | Facility: HOSPITAL | Age: 68
LOS: 1 days | End: 2023-11-13

## 2023-11-13 DIAGNOSIS — Z90.79 ACQUIRED ABSENCE OF OTHER GENITAL ORGAN(S): Chronic | ICD-10-CM

## 2023-11-13 DIAGNOSIS — I35.0 NONRHEUMATIC AORTIC (VALVE) STENOSIS: ICD-10-CM

## 2023-11-13 DIAGNOSIS — I35.1 NONRHEUMATIC AORTIC (VALVE) INSUFFICIENCY: ICD-10-CM

## 2023-11-13 DIAGNOSIS — Z90.5 ACQUIRED ABSENCE OF KIDNEY: Chronic | ICD-10-CM

## 2023-11-15 ENCOUNTER — APPOINTMENT (OUTPATIENT)
Age: 68
End: 2023-11-15

## 2023-11-15 ENCOUNTER — OUTPATIENT (OUTPATIENT)
Dept: OUTPATIENT SERVICES | Facility: HOSPITAL | Age: 68
LOS: 1 days | End: 2023-11-15

## 2023-11-15 DIAGNOSIS — Z90.79 ACQUIRED ABSENCE OF OTHER GENITAL ORGAN(S): Chronic | ICD-10-CM

## 2023-11-15 DIAGNOSIS — I35.0 NONRHEUMATIC AORTIC (VALVE) STENOSIS: ICD-10-CM

## 2023-11-15 DIAGNOSIS — I35.1 NONRHEUMATIC AORTIC (VALVE) INSUFFICIENCY: ICD-10-CM

## 2023-11-15 DIAGNOSIS — Z90.5 ACQUIRED ABSENCE OF KIDNEY: Chronic | ICD-10-CM

## 2023-11-16 ENCOUNTER — APPOINTMENT (OUTPATIENT)
Dept: MEDICATION MANAGEMENT | Facility: CLINIC | Age: 68
End: 2023-11-16

## 2023-11-17 ENCOUNTER — OUTPATIENT (OUTPATIENT)
Dept: OUTPATIENT SERVICES | Facility: HOSPITAL | Age: 68
LOS: 1 days | End: 2023-11-17

## 2023-11-17 ENCOUNTER — APPOINTMENT (OUTPATIENT)
Age: 68
End: 2023-11-17

## 2023-11-17 DIAGNOSIS — I35.1 NONRHEUMATIC AORTIC (VALVE) INSUFFICIENCY: ICD-10-CM

## 2023-11-17 DIAGNOSIS — I35.0 NONRHEUMATIC AORTIC (VALVE) STENOSIS: ICD-10-CM

## 2023-11-17 DIAGNOSIS — Z90.5 ACQUIRED ABSENCE OF KIDNEY: Chronic | ICD-10-CM

## 2023-11-17 DIAGNOSIS — Z90.79 ACQUIRED ABSENCE OF OTHER GENITAL ORGAN(S): Chronic | ICD-10-CM

## 2023-11-20 ENCOUNTER — APPOINTMENT (OUTPATIENT)
Age: 68
End: 2023-11-20

## 2023-11-20 ENCOUNTER — OUTPATIENT (OUTPATIENT)
Dept: OUTPATIENT SERVICES | Facility: HOSPITAL | Age: 68
LOS: 1 days | End: 2023-11-20

## 2023-11-20 DIAGNOSIS — I35.0 NONRHEUMATIC AORTIC (VALVE) STENOSIS: ICD-10-CM

## 2023-11-20 DIAGNOSIS — Z90.79 ACQUIRED ABSENCE OF OTHER GENITAL ORGAN(S): Chronic | ICD-10-CM

## 2023-11-20 DIAGNOSIS — I35.1 NONRHEUMATIC AORTIC (VALVE) INSUFFICIENCY: ICD-10-CM

## 2023-11-20 DIAGNOSIS — Z90.5 ACQUIRED ABSENCE OF KIDNEY: Chronic | ICD-10-CM

## 2023-11-22 ENCOUNTER — APPOINTMENT (OUTPATIENT)
Age: 68
End: 2023-11-22

## 2023-11-24 ENCOUNTER — APPOINTMENT (OUTPATIENT)
Age: 68
End: 2023-11-24

## 2023-11-27 ENCOUNTER — APPOINTMENT (OUTPATIENT)
Age: 68
End: 2023-11-27

## 2023-11-29 ENCOUNTER — APPOINTMENT (OUTPATIENT)
Age: 68
End: 2023-11-29

## 2023-12-01 ENCOUNTER — APPOINTMENT (OUTPATIENT)
Age: 68
End: 2023-12-01

## 2023-12-04 ENCOUNTER — APPOINTMENT (OUTPATIENT)
Age: 68
End: 2023-12-04

## 2023-12-04 ENCOUNTER — OUTPATIENT (OUTPATIENT)
Dept: OUTPATIENT SERVICES | Facility: HOSPITAL | Age: 68
LOS: 1 days | End: 2023-12-04
Payer: MEDICARE

## 2023-12-04 DIAGNOSIS — Z90.79 ACQUIRED ABSENCE OF OTHER GENITAL ORGAN(S): Chronic | ICD-10-CM

## 2023-12-04 DIAGNOSIS — Z90.5 ACQUIRED ABSENCE OF KIDNEY: Chronic | ICD-10-CM

## 2023-12-04 DIAGNOSIS — I35.1 NONRHEUMATIC AORTIC (VALVE) INSUFFICIENCY: ICD-10-CM

## 2023-12-04 DIAGNOSIS — I35.0 NONRHEUMATIC AORTIC (VALVE) STENOSIS: ICD-10-CM

## 2023-12-04 PROCEDURE — 93798 PHYS/QHP OP CAR RHAB W/ECG: CPT

## 2023-12-05 DIAGNOSIS — I35.1 NONRHEUMATIC AORTIC (VALVE) INSUFFICIENCY: ICD-10-CM

## 2023-12-05 DIAGNOSIS — I35.0 NONRHEUMATIC AORTIC (VALVE) STENOSIS: ICD-10-CM

## 2023-12-06 ENCOUNTER — APPOINTMENT (OUTPATIENT)
Age: 68
End: 2023-12-06

## 2023-12-06 ENCOUNTER — OUTPATIENT (OUTPATIENT)
Dept: OUTPATIENT SERVICES | Facility: HOSPITAL | Age: 68
LOS: 1 days | End: 2023-12-06

## 2023-12-06 DIAGNOSIS — Z90.5 ACQUIRED ABSENCE OF KIDNEY: Chronic | ICD-10-CM

## 2023-12-06 DIAGNOSIS — Z90.79 ACQUIRED ABSENCE OF OTHER GENITAL ORGAN(S): Chronic | ICD-10-CM

## 2023-12-06 DIAGNOSIS — I35.0 NONRHEUMATIC AORTIC (VALVE) STENOSIS: ICD-10-CM

## 2023-12-06 DIAGNOSIS — I35.1 NONRHEUMATIC AORTIC (VALVE) INSUFFICIENCY: ICD-10-CM

## 2023-12-08 ENCOUNTER — OUTPATIENT (OUTPATIENT)
Dept: OUTPATIENT SERVICES | Facility: HOSPITAL | Age: 68
LOS: 1 days | End: 2023-12-08

## 2023-12-08 ENCOUNTER — APPOINTMENT (OUTPATIENT)
Age: 68
End: 2023-12-08

## 2023-12-08 DIAGNOSIS — I35.1 NONRHEUMATIC AORTIC (VALVE) INSUFFICIENCY: ICD-10-CM

## 2023-12-08 DIAGNOSIS — Z90.5 ACQUIRED ABSENCE OF KIDNEY: Chronic | ICD-10-CM

## 2023-12-08 DIAGNOSIS — I35.0 NONRHEUMATIC AORTIC (VALVE) STENOSIS: ICD-10-CM

## 2023-12-08 DIAGNOSIS — Z90.79 ACQUIRED ABSENCE OF OTHER GENITAL ORGAN(S): Chronic | ICD-10-CM

## 2023-12-11 ENCOUNTER — OUTPATIENT (OUTPATIENT)
Dept: OUTPATIENT SERVICES | Facility: HOSPITAL | Age: 68
LOS: 1 days | End: 2023-12-11

## 2023-12-11 ENCOUNTER — APPOINTMENT (OUTPATIENT)
Age: 68
End: 2023-12-11

## 2023-12-11 DIAGNOSIS — I35.0 NONRHEUMATIC AORTIC (VALVE) STENOSIS: ICD-10-CM

## 2023-12-11 DIAGNOSIS — Z90.5 ACQUIRED ABSENCE OF KIDNEY: Chronic | ICD-10-CM

## 2023-12-11 DIAGNOSIS — I35.1 NONRHEUMATIC AORTIC (VALVE) INSUFFICIENCY: ICD-10-CM

## 2023-12-11 DIAGNOSIS — Z90.79 ACQUIRED ABSENCE OF OTHER GENITAL ORGAN(S): Chronic | ICD-10-CM

## 2023-12-13 ENCOUNTER — APPOINTMENT (OUTPATIENT)
Age: 68
End: 2023-12-13

## 2023-12-14 ENCOUNTER — APPOINTMENT (OUTPATIENT)
Dept: PULMONOLOGY | Facility: CLINIC | Age: 68
End: 2023-12-14

## 2023-12-15 ENCOUNTER — OUTPATIENT (OUTPATIENT)
Dept: OUTPATIENT SERVICES | Facility: HOSPITAL | Age: 68
LOS: 1 days | End: 2023-12-15

## 2023-12-15 ENCOUNTER — APPOINTMENT (OUTPATIENT)
Age: 68
End: 2023-12-15

## 2023-12-15 DIAGNOSIS — I35.1 NONRHEUMATIC AORTIC (VALVE) INSUFFICIENCY: ICD-10-CM

## 2023-12-15 DIAGNOSIS — I35.0 NONRHEUMATIC AORTIC (VALVE) STENOSIS: ICD-10-CM

## 2023-12-15 DIAGNOSIS — Z90.79 ACQUIRED ABSENCE OF OTHER GENITAL ORGAN(S): Chronic | ICD-10-CM

## 2023-12-15 DIAGNOSIS — Z90.5 ACQUIRED ABSENCE OF KIDNEY: Chronic | ICD-10-CM

## 2023-12-18 ENCOUNTER — APPOINTMENT (OUTPATIENT)
Age: 68
End: 2023-12-18

## 2023-12-18 ENCOUNTER — OUTPATIENT (OUTPATIENT)
Dept: OUTPATIENT SERVICES | Facility: HOSPITAL | Age: 68
LOS: 1 days | End: 2023-12-18

## 2023-12-18 DIAGNOSIS — I35.1 NONRHEUMATIC AORTIC (VALVE) INSUFFICIENCY: ICD-10-CM

## 2023-12-18 DIAGNOSIS — I35.0 NONRHEUMATIC AORTIC (VALVE) STENOSIS: ICD-10-CM

## 2023-12-18 DIAGNOSIS — Z90.5 ACQUIRED ABSENCE OF KIDNEY: Chronic | ICD-10-CM

## 2023-12-20 ENCOUNTER — APPOINTMENT (OUTPATIENT)
Age: 68
End: 2023-12-20

## 2023-12-20 ENCOUNTER — OUTPATIENT (OUTPATIENT)
Dept: OUTPATIENT SERVICES | Facility: HOSPITAL | Age: 68
LOS: 1 days | End: 2023-12-20

## 2023-12-20 DIAGNOSIS — I35.1 NONRHEUMATIC AORTIC (VALVE) INSUFFICIENCY: ICD-10-CM

## 2023-12-20 DIAGNOSIS — I35.0 NONRHEUMATIC AORTIC (VALVE) STENOSIS: ICD-10-CM

## 2023-12-20 DIAGNOSIS — Z90.79 ACQUIRED ABSENCE OF OTHER GENITAL ORGAN(S): Chronic | ICD-10-CM

## 2023-12-21 ENCOUNTER — APPOINTMENT (OUTPATIENT)
Dept: PULMONOLOGY | Facility: CLINIC | Age: 68
End: 2023-12-21
Payer: MEDICARE

## 2023-12-21 VITALS
OXYGEN SATURATION: 98 % | BODY MASS INDEX: 28.35 KG/M2 | WEIGHT: 198 LBS | HEIGHT: 70 IN | DIASTOLIC BLOOD PRESSURE: 86 MMHG | SYSTOLIC BLOOD PRESSURE: 142 MMHG | HEART RATE: 67 BPM

## 2023-12-21 DIAGNOSIS — G47.33 OBSTRUCTIVE SLEEP APNEA (ADULT) (PEDIATRIC): ICD-10-CM

## 2023-12-21 PROCEDURE — 99213 OFFICE O/P EST LOW 20 MIN: CPT

## 2023-12-21 NOTE — REASON FOR VISIT
[Follow-Up] : a follow-up visit [Sleep Apnea] : sleep apnea [[id number]] : Doing very well not having any issues.  Recently needed aortic and mitral valve repairs.  He is feeling much better after this.  Very compliant with the CPAP.

## 2023-12-21 NOTE — ASSESSMENT
[FreeTextEntry1] : Assessment: [place] Obsity EDS   PLAN: The patient is benefitting from the PAP device. New supplies will be ordered when required. Weight maintenance discussed. I stressed the need maintain compliance with the PAP device. The patient is not to use an Ozone or UV sterilizer. F/U in 12 months

## 2023-12-22 ENCOUNTER — APPOINTMENT (OUTPATIENT)
Age: 68
End: 2023-12-22

## 2023-12-22 ENCOUNTER — OUTPATIENT (OUTPATIENT)
Dept: OUTPATIENT SERVICES | Facility: HOSPITAL | Age: 68
LOS: 1 days | End: 2023-12-22

## 2023-12-22 DIAGNOSIS — Z90.5 ACQUIRED ABSENCE OF KIDNEY: Chronic | ICD-10-CM

## 2023-12-22 DIAGNOSIS — I35.1 NONRHEUMATIC AORTIC (VALVE) INSUFFICIENCY: ICD-10-CM

## 2023-12-22 DIAGNOSIS — I35.0 NONRHEUMATIC AORTIC (VALVE) STENOSIS: ICD-10-CM

## 2023-12-27 ENCOUNTER — OUTPATIENT (OUTPATIENT)
Dept: OUTPATIENT SERVICES | Facility: HOSPITAL | Age: 68
LOS: 1 days | End: 2023-12-27

## 2023-12-27 ENCOUNTER — APPOINTMENT (OUTPATIENT)
Age: 68
End: 2023-12-27

## 2023-12-27 DIAGNOSIS — Z90.5 ACQUIRED ABSENCE OF KIDNEY: Chronic | ICD-10-CM

## 2023-12-27 DIAGNOSIS — Z90.79 ACQUIRED ABSENCE OF OTHER GENITAL ORGAN(S): Chronic | ICD-10-CM

## 2023-12-27 DIAGNOSIS — I35.0 NONRHEUMATIC AORTIC (VALVE) STENOSIS: ICD-10-CM

## 2023-12-27 DIAGNOSIS — I35.1 NONRHEUMATIC AORTIC (VALVE) INSUFFICIENCY: ICD-10-CM

## 2023-12-29 ENCOUNTER — APPOINTMENT (OUTPATIENT)
Age: 68
End: 2023-12-29

## 2024-01-03 ENCOUNTER — APPOINTMENT (OUTPATIENT)
Age: 69
End: 2024-01-03

## 2024-01-03 ENCOUNTER — OUTPATIENT (OUTPATIENT)
Dept: OUTPATIENT SERVICES | Facility: HOSPITAL | Age: 69
LOS: 1 days | End: 2024-01-03
Payer: MEDICARE

## 2024-01-03 DIAGNOSIS — I35.0 NONRHEUMATIC AORTIC (VALVE) STENOSIS: ICD-10-CM

## 2024-01-03 DIAGNOSIS — I35.1 NONRHEUMATIC AORTIC (VALVE) INSUFFICIENCY: ICD-10-CM

## 2024-01-03 DIAGNOSIS — Z90.5 ACQUIRED ABSENCE OF KIDNEY: Chronic | ICD-10-CM

## 2024-01-03 DIAGNOSIS — Z90.79 ACQUIRED ABSENCE OF OTHER GENITAL ORGAN(S): Chronic | ICD-10-CM

## 2024-01-03 PROCEDURE — 93798 PHYS/QHP OP CAR RHAB W/ECG: CPT

## 2024-01-04 DIAGNOSIS — I35.0 NONRHEUMATIC AORTIC (VALVE) STENOSIS: ICD-10-CM

## 2024-01-04 DIAGNOSIS — I35.1 NONRHEUMATIC AORTIC (VALVE) INSUFFICIENCY: ICD-10-CM

## 2024-01-05 ENCOUNTER — APPOINTMENT (OUTPATIENT)
Age: 69
End: 2024-01-05

## 2024-01-05 ENCOUNTER — OUTPATIENT (OUTPATIENT)
Dept: OUTPATIENT SERVICES | Facility: HOSPITAL | Age: 69
LOS: 1 days | End: 2024-01-05

## 2024-01-05 DIAGNOSIS — I35.1 NONRHEUMATIC AORTIC (VALVE) INSUFFICIENCY: ICD-10-CM

## 2024-01-05 DIAGNOSIS — I35.0 NONRHEUMATIC AORTIC (VALVE) STENOSIS: ICD-10-CM

## 2024-01-05 DIAGNOSIS — Z90.5 ACQUIRED ABSENCE OF KIDNEY: Chronic | ICD-10-CM

## 2024-01-05 DIAGNOSIS — Z90.79 ACQUIRED ABSENCE OF OTHER GENITAL ORGAN(S): Chronic | ICD-10-CM

## 2024-01-08 ENCOUNTER — APPOINTMENT (OUTPATIENT)
Age: 69
End: 2024-01-08

## 2024-01-08 ENCOUNTER — OUTPATIENT (OUTPATIENT)
Dept: OUTPATIENT SERVICES | Facility: HOSPITAL | Age: 69
LOS: 1 days | End: 2024-01-08

## 2024-01-08 DIAGNOSIS — I35.1 NONRHEUMATIC AORTIC (VALVE) INSUFFICIENCY: ICD-10-CM

## 2024-01-08 DIAGNOSIS — I35.0 NONRHEUMATIC AORTIC (VALVE) STENOSIS: ICD-10-CM

## 2024-01-08 DIAGNOSIS — Z90.79 ACQUIRED ABSENCE OF OTHER GENITAL ORGAN(S): Chronic | ICD-10-CM

## 2024-01-10 ENCOUNTER — OUTPATIENT (OUTPATIENT)
Dept: OUTPATIENT SERVICES | Facility: HOSPITAL | Age: 69
LOS: 1 days | End: 2024-01-10

## 2024-01-10 ENCOUNTER — APPOINTMENT (OUTPATIENT)
Age: 69
End: 2024-01-10

## 2024-01-10 DIAGNOSIS — I35.0 NONRHEUMATIC AORTIC (VALVE) STENOSIS: ICD-10-CM

## 2024-01-10 DIAGNOSIS — I35.1 NONRHEUMATIC AORTIC (VALVE) INSUFFICIENCY: ICD-10-CM

## 2024-01-10 DIAGNOSIS — Z90.79 ACQUIRED ABSENCE OF OTHER GENITAL ORGAN(S): Chronic | ICD-10-CM

## 2024-01-12 ENCOUNTER — OUTPATIENT (OUTPATIENT)
Dept: OUTPATIENT SERVICES | Facility: HOSPITAL | Age: 69
LOS: 1 days | End: 2024-01-12

## 2024-01-12 ENCOUNTER — APPOINTMENT (OUTPATIENT)
Age: 69
End: 2024-01-12

## 2024-01-12 DIAGNOSIS — I35.1 NONRHEUMATIC AORTIC (VALVE) INSUFFICIENCY: ICD-10-CM

## 2024-01-12 DIAGNOSIS — Z90.5 ACQUIRED ABSENCE OF KIDNEY: Chronic | ICD-10-CM

## 2024-01-12 DIAGNOSIS — Z90.79 ACQUIRED ABSENCE OF OTHER GENITAL ORGAN(S): Chronic | ICD-10-CM

## 2024-01-12 DIAGNOSIS — I35.0 NONRHEUMATIC AORTIC (VALVE) STENOSIS: ICD-10-CM

## 2024-01-17 ENCOUNTER — OUTPATIENT (OUTPATIENT)
Dept: OUTPATIENT SERVICES | Facility: HOSPITAL | Age: 69
LOS: 1 days | End: 2024-01-17

## 2024-01-17 ENCOUNTER — APPOINTMENT (OUTPATIENT)
Age: 69
End: 2024-01-17

## 2024-01-17 DIAGNOSIS — I35.0 NONRHEUMATIC AORTIC (VALVE) STENOSIS: ICD-10-CM

## 2024-01-17 DIAGNOSIS — Z90.5 ACQUIRED ABSENCE OF KIDNEY: Chronic | ICD-10-CM

## 2024-01-17 DIAGNOSIS — Z90.79 ACQUIRED ABSENCE OF OTHER GENITAL ORGAN(S): Chronic | ICD-10-CM

## 2024-01-17 DIAGNOSIS — I35.1 NONRHEUMATIC AORTIC (VALVE) INSUFFICIENCY: ICD-10-CM

## 2024-01-19 ENCOUNTER — APPOINTMENT (OUTPATIENT)
Age: 69
End: 2024-01-19

## 2024-01-19 ENCOUNTER — OUTPATIENT (OUTPATIENT)
Dept: OUTPATIENT SERVICES | Facility: HOSPITAL | Age: 69
LOS: 1 days | End: 2024-01-19

## 2024-01-19 DIAGNOSIS — Z90.79 ACQUIRED ABSENCE OF OTHER GENITAL ORGAN(S): Chronic | ICD-10-CM

## 2024-01-19 DIAGNOSIS — I35.1 NONRHEUMATIC AORTIC (VALVE) INSUFFICIENCY: ICD-10-CM

## 2024-01-19 DIAGNOSIS — Z90.5 ACQUIRED ABSENCE OF KIDNEY: Chronic | ICD-10-CM

## 2024-01-19 DIAGNOSIS — I35.0 NONRHEUMATIC AORTIC (VALVE) STENOSIS: ICD-10-CM

## 2024-01-22 ENCOUNTER — OUTPATIENT (OUTPATIENT)
Dept: OUTPATIENT SERVICES | Facility: HOSPITAL | Age: 69
LOS: 1 days | End: 2024-01-22

## 2024-01-22 ENCOUNTER — APPOINTMENT (OUTPATIENT)
Age: 69
End: 2024-01-22

## 2024-01-22 DIAGNOSIS — I35.0 NONRHEUMATIC AORTIC (VALVE) STENOSIS: ICD-10-CM

## 2024-01-22 DIAGNOSIS — Z90.5 ACQUIRED ABSENCE OF KIDNEY: Chronic | ICD-10-CM

## 2024-01-22 DIAGNOSIS — I35.1 NONRHEUMATIC AORTIC (VALVE) INSUFFICIENCY: ICD-10-CM

## 2024-01-22 DIAGNOSIS — Z90.79 ACQUIRED ABSENCE OF OTHER GENITAL ORGAN(S): Chronic | ICD-10-CM

## 2024-01-24 ENCOUNTER — APPOINTMENT (OUTPATIENT)
Age: 69
End: 2024-01-24

## 2024-01-24 ENCOUNTER — OUTPATIENT (OUTPATIENT)
Dept: OUTPATIENT SERVICES | Facility: HOSPITAL | Age: 69
LOS: 1 days | End: 2024-01-24

## 2024-01-24 DIAGNOSIS — Z90.5 ACQUIRED ABSENCE OF KIDNEY: Chronic | ICD-10-CM

## 2024-01-24 DIAGNOSIS — I35.0 NONRHEUMATIC AORTIC (VALVE) STENOSIS: ICD-10-CM

## 2024-01-24 DIAGNOSIS — Z90.79 ACQUIRED ABSENCE OF OTHER GENITAL ORGAN(S): Chronic | ICD-10-CM

## 2024-01-24 DIAGNOSIS — I35.1 NONRHEUMATIC AORTIC (VALVE) INSUFFICIENCY: ICD-10-CM

## 2024-01-26 ENCOUNTER — APPOINTMENT (OUTPATIENT)
Age: 69
End: 2024-01-26

## 2024-01-26 ENCOUNTER — OUTPATIENT (OUTPATIENT)
Dept: OUTPATIENT SERVICES | Facility: HOSPITAL | Age: 69
LOS: 1 days | End: 2024-01-26

## 2024-01-26 DIAGNOSIS — I35.1 NONRHEUMATIC AORTIC (VALVE) INSUFFICIENCY: ICD-10-CM

## 2024-01-26 DIAGNOSIS — Z90.79 ACQUIRED ABSENCE OF OTHER GENITAL ORGAN(S): Chronic | ICD-10-CM

## 2024-01-26 DIAGNOSIS — I35.0 NONRHEUMATIC AORTIC (VALVE) STENOSIS: ICD-10-CM

## 2024-01-26 DIAGNOSIS — Z90.5 ACQUIRED ABSENCE OF KIDNEY: Chronic | ICD-10-CM

## 2024-01-29 ENCOUNTER — APPOINTMENT (OUTPATIENT)
Age: 69
End: 2024-01-29

## 2024-01-29 ENCOUNTER — OUTPATIENT (OUTPATIENT)
Dept: OUTPATIENT SERVICES | Facility: HOSPITAL | Age: 69
LOS: 1 days | End: 2024-01-29

## 2024-01-29 DIAGNOSIS — I35.1 NONRHEUMATIC AORTIC (VALVE) INSUFFICIENCY: ICD-10-CM

## 2024-01-29 DIAGNOSIS — I35.0 NONRHEUMATIC AORTIC (VALVE) STENOSIS: ICD-10-CM

## 2024-01-29 DIAGNOSIS — Z90.79 ACQUIRED ABSENCE OF OTHER GENITAL ORGAN(S): Chronic | ICD-10-CM

## 2024-01-29 DIAGNOSIS — Z90.5 ACQUIRED ABSENCE OF KIDNEY: Chronic | ICD-10-CM

## 2024-01-31 ENCOUNTER — APPOINTMENT (OUTPATIENT)
Age: 69
End: 2024-01-31

## 2024-01-31 ENCOUNTER — OUTPATIENT (OUTPATIENT)
Dept: OUTPATIENT SERVICES | Facility: HOSPITAL | Age: 69
LOS: 1 days | End: 2024-01-31

## 2024-01-31 DIAGNOSIS — I35.0 NONRHEUMATIC AORTIC (VALVE) STENOSIS: ICD-10-CM

## 2024-01-31 DIAGNOSIS — I35.1 NONRHEUMATIC AORTIC (VALVE) INSUFFICIENCY: ICD-10-CM

## 2024-02-02 ENCOUNTER — APPOINTMENT (OUTPATIENT)
Age: 69
End: 2024-02-02

## 2024-02-02 ENCOUNTER — OUTPATIENT (OUTPATIENT)
Dept: OUTPATIENT SERVICES | Facility: HOSPITAL | Age: 69
LOS: 1 days | End: 2024-02-02
Payer: MEDICARE

## 2024-02-02 DIAGNOSIS — I35.1 NONRHEUMATIC AORTIC (VALVE) INSUFFICIENCY: ICD-10-CM

## 2024-02-02 DIAGNOSIS — I35.0 NONRHEUMATIC AORTIC (VALVE) STENOSIS: ICD-10-CM

## 2024-02-02 DIAGNOSIS — Z90.79 ACQUIRED ABSENCE OF OTHER GENITAL ORGAN(S): Chronic | ICD-10-CM

## 2024-02-02 DIAGNOSIS — Z90.5 ACQUIRED ABSENCE OF KIDNEY: Chronic | ICD-10-CM

## 2024-02-02 PROCEDURE — 93798 PHYS/QHP OP CAR RHAB W/ECG: CPT

## 2024-02-03 DIAGNOSIS — I35.1 NONRHEUMATIC AORTIC (VALVE) INSUFFICIENCY: ICD-10-CM

## 2024-02-03 DIAGNOSIS — I35.0 NONRHEUMATIC AORTIC (VALVE) STENOSIS: ICD-10-CM

## 2024-02-05 ENCOUNTER — APPOINTMENT (OUTPATIENT)
Age: 69
End: 2024-02-05

## 2024-02-07 ENCOUNTER — APPOINTMENT (OUTPATIENT)
Age: 69
End: 2024-02-07

## 2024-02-09 ENCOUNTER — APPOINTMENT (OUTPATIENT)
Age: 69
End: 2024-02-09

## 2024-02-12 ENCOUNTER — OUTPATIENT (OUTPATIENT)
Dept: OUTPATIENT SERVICES | Facility: HOSPITAL | Age: 69
LOS: 1 days | End: 2024-02-12

## 2024-02-12 ENCOUNTER — APPOINTMENT (OUTPATIENT)
Age: 69
End: 2024-02-12

## 2024-02-12 DIAGNOSIS — I35.1 NONRHEUMATIC AORTIC (VALVE) INSUFFICIENCY: ICD-10-CM

## 2024-02-12 DIAGNOSIS — I35.0 NONRHEUMATIC AORTIC (VALVE) STENOSIS: ICD-10-CM

## 2024-02-12 DIAGNOSIS — Z90.79 ACQUIRED ABSENCE OF OTHER GENITAL ORGAN(S): Chronic | ICD-10-CM

## 2024-02-14 ENCOUNTER — OUTPATIENT (OUTPATIENT)
Dept: OUTPATIENT SERVICES | Facility: HOSPITAL | Age: 69
LOS: 1 days | End: 2024-02-14

## 2024-02-14 ENCOUNTER — APPOINTMENT (OUTPATIENT)
Age: 69
End: 2024-02-14

## 2024-02-14 DIAGNOSIS — I35.0 NONRHEUMATIC AORTIC (VALVE) STENOSIS: ICD-10-CM

## 2024-02-14 DIAGNOSIS — I35.1 NONRHEUMATIC AORTIC (VALVE) INSUFFICIENCY: ICD-10-CM

## 2024-02-14 DIAGNOSIS — Z90.79 ACQUIRED ABSENCE OF OTHER GENITAL ORGAN(S): Chronic | ICD-10-CM

## 2024-02-14 DIAGNOSIS — Z90.5 ACQUIRED ABSENCE OF KIDNEY: Chronic | ICD-10-CM

## 2024-02-16 ENCOUNTER — APPOINTMENT (OUTPATIENT)
Age: 69
End: 2024-02-16

## 2024-02-16 ENCOUNTER — OUTPATIENT (OUTPATIENT)
Dept: OUTPATIENT SERVICES | Facility: HOSPITAL | Age: 69
LOS: 1 days | End: 2024-02-16

## 2024-02-16 DIAGNOSIS — I35.0 NONRHEUMATIC AORTIC (VALVE) STENOSIS: ICD-10-CM

## 2024-02-16 DIAGNOSIS — Z90.5 ACQUIRED ABSENCE OF KIDNEY: Chronic | ICD-10-CM

## 2024-02-16 DIAGNOSIS — Z90.79 ACQUIRED ABSENCE OF OTHER GENITAL ORGAN(S): Chronic | ICD-10-CM

## 2024-02-16 DIAGNOSIS — I35.1 NONRHEUMATIC AORTIC (VALVE) INSUFFICIENCY: ICD-10-CM

## 2024-02-21 ENCOUNTER — OUTPATIENT (OUTPATIENT)
Dept: OUTPATIENT SERVICES | Facility: HOSPITAL | Age: 69
LOS: 1 days | End: 2024-02-21

## 2024-02-21 ENCOUNTER — APPOINTMENT (OUTPATIENT)
Age: 69
End: 2024-02-21

## 2024-02-21 DIAGNOSIS — Z90.5 ACQUIRED ABSENCE OF KIDNEY: Chronic | ICD-10-CM

## 2024-02-21 DIAGNOSIS — I35.0 NONRHEUMATIC AORTIC (VALVE) STENOSIS: ICD-10-CM

## 2024-02-21 DIAGNOSIS — I35.1 NONRHEUMATIC AORTIC (VALVE) INSUFFICIENCY: ICD-10-CM

## 2024-02-23 ENCOUNTER — OUTPATIENT (OUTPATIENT)
Dept: OUTPATIENT SERVICES | Facility: HOSPITAL | Age: 69
LOS: 1 days | End: 2024-02-23

## 2024-02-23 ENCOUNTER — APPOINTMENT (OUTPATIENT)
Age: 69
End: 2024-02-23

## 2024-02-23 DIAGNOSIS — I35.1 NONRHEUMATIC AORTIC (VALVE) INSUFFICIENCY: ICD-10-CM

## 2024-02-23 DIAGNOSIS — Z90.79 ACQUIRED ABSENCE OF OTHER GENITAL ORGAN(S): Chronic | ICD-10-CM

## 2024-02-23 DIAGNOSIS — I35.0 NONRHEUMATIC AORTIC (VALVE) STENOSIS: ICD-10-CM

## 2024-02-23 DIAGNOSIS — Z90.5 ACQUIRED ABSENCE OF KIDNEY: Chronic | ICD-10-CM

## 2024-02-26 ENCOUNTER — OUTPATIENT (OUTPATIENT)
Dept: OUTPATIENT SERVICES | Facility: HOSPITAL | Age: 69
LOS: 1 days | End: 2024-02-26

## 2024-02-26 ENCOUNTER — APPOINTMENT (OUTPATIENT)
Age: 69
End: 2024-02-26

## 2024-02-26 DIAGNOSIS — I35.1 NONRHEUMATIC AORTIC (VALVE) INSUFFICIENCY: ICD-10-CM

## 2024-02-26 DIAGNOSIS — Z90.79 ACQUIRED ABSENCE OF OTHER GENITAL ORGAN(S): Chronic | ICD-10-CM

## 2024-02-26 DIAGNOSIS — I35.0 NONRHEUMATIC AORTIC (VALVE) STENOSIS: ICD-10-CM

## 2024-02-26 DIAGNOSIS — Z90.5 ACQUIRED ABSENCE OF KIDNEY: Chronic | ICD-10-CM

## 2024-02-28 ENCOUNTER — OUTPATIENT (OUTPATIENT)
Dept: OUTPATIENT SERVICES | Facility: HOSPITAL | Age: 69
LOS: 1 days | End: 2024-02-28

## 2024-02-28 ENCOUNTER — APPOINTMENT (OUTPATIENT)
Age: 69
End: 2024-02-28

## 2024-02-28 DIAGNOSIS — I35.0 NONRHEUMATIC AORTIC (VALVE) STENOSIS: ICD-10-CM

## 2024-02-28 DIAGNOSIS — Z90.5 ACQUIRED ABSENCE OF KIDNEY: Chronic | ICD-10-CM

## 2024-02-28 DIAGNOSIS — I35.1 NONRHEUMATIC AORTIC (VALVE) INSUFFICIENCY: ICD-10-CM

## 2024-02-28 DIAGNOSIS — Z90.79 ACQUIRED ABSENCE OF OTHER GENITAL ORGAN(S): Chronic | ICD-10-CM

## 2024-03-01 ENCOUNTER — APPOINTMENT (OUTPATIENT)
Age: 69
End: 2024-03-01

## 2024-03-01 ENCOUNTER — OUTPATIENT (OUTPATIENT)
Dept: OUTPATIENT SERVICES | Facility: HOSPITAL | Age: 69
LOS: 1 days | End: 2024-03-01
Payer: MEDICARE

## 2024-03-01 DIAGNOSIS — Z90.5 ACQUIRED ABSENCE OF KIDNEY: Chronic | ICD-10-CM

## 2024-03-01 DIAGNOSIS — I35.1 NONRHEUMATIC AORTIC (VALVE) INSUFFICIENCY: ICD-10-CM

## 2024-03-01 DIAGNOSIS — I34.0 NONRHEUMATIC MITRAL (VALVE) INSUFFICIENCY: ICD-10-CM

## 2024-03-01 PROCEDURE — 93798 PHYS/QHP OP CAR RHAB W/ECG: CPT

## 2024-03-02 DIAGNOSIS — I34.0 NONRHEUMATIC MITRAL (VALVE) INSUFFICIENCY: ICD-10-CM

## 2024-03-02 DIAGNOSIS — I35.1 NONRHEUMATIC AORTIC (VALVE) INSUFFICIENCY: ICD-10-CM

## 2024-03-04 ENCOUNTER — APPOINTMENT (OUTPATIENT)
Age: 69
End: 2024-03-04

## 2024-03-04 ENCOUNTER — OUTPATIENT (OUTPATIENT)
Dept: OUTPATIENT SERVICES | Facility: HOSPITAL | Age: 69
LOS: 1 days | End: 2024-03-04

## 2024-03-04 DIAGNOSIS — I35.1 NONRHEUMATIC AORTIC (VALVE) INSUFFICIENCY: ICD-10-CM

## 2024-03-04 DIAGNOSIS — I34.0 NONRHEUMATIC MITRAL (VALVE) INSUFFICIENCY: ICD-10-CM

## 2024-03-06 ENCOUNTER — APPOINTMENT (OUTPATIENT)
Age: 69
End: 2024-03-06

## 2024-03-06 ENCOUNTER — OUTPATIENT (OUTPATIENT)
Dept: OUTPATIENT SERVICES | Facility: HOSPITAL | Age: 69
LOS: 1 days | End: 2024-03-06

## 2024-03-06 DIAGNOSIS — Z90.79 ACQUIRED ABSENCE OF OTHER GENITAL ORGAN(S): Chronic | ICD-10-CM

## 2024-03-06 DIAGNOSIS — I34.0 NONRHEUMATIC MITRAL (VALVE) INSUFFICIENCY: ICD-10-CM

## 2024-03-06 DIAGNOSIS — I35.1 NONRHEUMATIC AORTIC (VALVE) INSUFFICIENCY: ICD-10-CM

## 2024-03-08 ENCOUNTER — APPOINTMENT (OUTPATIENT)
Age: 69
End: 2024-03-08

## 2024-03-11 ENCOUNTER — OUTPATIENT (OUTPATIENT)
Dept: OUTPATIENT SERVICES | Facility: HOSPITAL | Age: 69
LOS: 1 days | End: 2024-03-11

## 2024-03-11 ENCOUNTER — APPOINTMENT (OUTPATIENT)
Age: 69
End: 2024-03-11

## 2024-03-11 DIAGNOSIS — I35.1 NONRHEUMATIC AORTIC (VALVE) INSUFFICIENCY: ICD-10-CM

## 2024-03-11 DIAGNOSIS — Z90.5 ACQUIRED ABSENCE OF KIDNEY: Chronic | ICD-10-CM

## 2024-03-11 DIAGNOSIS — I34.0 NONRHEUMATIC MITRAL (VALVE) INSUFFICIENCY: ICD-10-CM

## 2024-03-13 ENCOUNTER — OUTPATIENT (OUTPATIENT)
Dept: OUTPATIENT SERVICES | Facility: HOSPITAL | Age: 69
LOS: 1 days | End: 2024-03-13

## 2024-03-13 ENCOUNTER — APPOINTMENT (OUTPATIENT)
Age: 69
End: 2024-03-13

## 2024-03-13 DIAGNOSIS — Z90.79 ACQUIRED ABSENCE OF OTHER GENITAL ORGAN(S): Chronic | ICD-10-CM

## 2024-03-13 DIAGNOSIS — I35.1 NONRHEUMATIC AORTIC (VALVE) INSUFFICIENCY: ICD-10-CM

## 2024-03-13 DIAGNOSIS — I34.0 NONRHEUMATIC MITRAL (VALVE) INSUFFICIENCY: ICD-10-CM

## 2024-03-13 DIAGNOSIS — Z90.5 ACQUIRED ABSENCE OF KIDNEY: Chronic | ICD-10-CM

## 2024-03-15 ENCOUNTER — OUTPATIENT (OUTPATIENT)
Dept: OUTPATIENT SERVICES | Facility: HOSPITAL | Age: 69
LOS: 1 days | End: 2024-03-15

## 2024-03-15 ENCOUNTER — APPOINTMENT (OUTPATIENT)
Age: 69
End: 2024-03-15

## 2024-03-15 DIAGNOSIS — Z90.5 ACQUIRED ABSENCE OF KIDNEY: Chronic | ICD-10-CM

## 2024-03-15 DIAGNOSIS — I35.1 NONRHEUMATIC AORTIC (VALVE) INSUFFICIENCY: ICD-10-CM

## 2024-03-15 DIAGNOSIS — Z90.79 ACQUIRED ABSENCE OF OTHER GENITAL ORGAN(S): Chronic | ICD-10-CM

## 2024-03-15 DIAGNOSIS — I34.0 NONRHEUMATIC MITRAL (VALVE) INSUFFICIENCY: ICD-10-CM

## 2024-03-18 ENCOUNTER — APPOINTMENT (OUTPATIENT)
Age: 69
End: 2024-03-18

## 2024-03-18 ENCOUNTER — OUTPATIENT (OUTPATIENT)
Dept: OUTPATIENT SERVICES | Facility: HOSPITAL | Age: 69
LOS: 1 days | End: 2024-03-18

## 2024-03-18 DIAGNOSIS — I34.0 NONRHEUMATIC MITRAL (VALVE) INSUFFICIENCY: ICD-10-CM

## 2024-03-18 DIAGNOSIS — Z90.5 ACQUIRED ABSENCE OF KIDNEY: Chronic | ICD-10-CM

## 2024-03-18 DIAGNOSIS — I35.1 NONRHEUMATIC AORTIC (VALVE) INSUFFICIENCY: ICD-10-CM

## 2024-03-20 ENCOUNTER — APPOINTMENT (OUTPATIENT)
Age: 69
End: 2024-03-20

## 2024-03-20 ENCOUNTER — OUTPATIENT (OUTPATIENT)
Dept: OUTPATIENT SERVICES | Facility: HOSPITAL | Age: 69
LOS: 1 days | End: 2024-03-20

## 2024-03-20 DIAGNOSIS — I35.1 NONRHEUMATIC AORTIC (VALVE) INSUFFICIENCY: ICD-10-CM

## 2024-03-20 DIAGNOSIS — I34.0 NONRHEUMATIC MITRAL (VALVE) INSUFFICIENCY: ICD-10-CM

## 2024-03-20 DIAGNOSIS — Z90.79 ACQUIRED ABSENCE OF OTHER GENITAL ORGAN(S): Chronic | ICD-10-CM

## 2024-03-20 DIAGNOSIS — Z90.5 ACQUIRED ABSENCE OF KIDNEY: Chronic | ICD-10-CM

## 2024-03-22 ENCOUNTER — APPOINTMENT (OUTPATIENT)
Age: 69
End: 2024-03-22

## 2024-03-25 ENCOUNTER — APPOINTMENT (OUTPATIENT)
Age: 69
End: 2024-03-25

## 2024-03-25 ENCOUNTER — OUTPATIENT (OUTPATIENT)
Dept: OUTPATIENT SERVICES | Facility: HOSPITAL | Age: 69
LOS: 1 days | End: 2024-03-25

## 2024-03-25 DIAGNOSIS — I35.1 NONRHEUMATIC AORTIC (VALVE) INSUFFICIENCY: ICD-10-CM

## 2024-03-25 DIAGNOSIS — Z90.5 ACQUIRED ABSENCE OF KIDNEY: Chronic | ICD-10-CM

## 2024-03-25 DIAGNOSIS — I34.0 NONRHEUMATIC MITRAL (VALVE) INSUFFICIENCY: ICD-10-CM

## 2024-03-25 DIAGNOSIS — Z90.79 ACQUIRED ABSENCE OF OTHER GENITAL ORGAN(S): Chronic | ICD-10-CM

## 2024-03-27 ENCOUNTER — OUTPATIENT (OUTPATIENT)
Dept: OUTPATIENT SERVICES | Facility: HOSPITAL | Age: 69
LOS: 1 days | End: 2024-03-27

## 2024-03-27 ENCOUNTER — APPOINTMENT (OUTPATIENT)
Age: 69
End: 2024-03-27

## 2024-03-27 DIAGNOSIS — Z90.79 ACQUIRED ABSENCE OF OTHER GENITAL ORGAN(S): Chronic | ICD-10-CM

## 2024-03-27 DIAGNOSIS — I35.1 NONRHEUMATIC AORTIC (VALVE) INSUFFICIENCY: ICD-10-CM

## 2024-03-27 DIAGNOSIS — Z90.5 ACQUIRED ABSENCE OF KIDNEY: Chronic | ICD-10-CM

## 2024-03-27 DIAGNOSIS — I34.0 NONRHEUMATIC MITRAL (VALVE) INSUFFICIENCY: ICD-10-CM

## 2024-03-29 ENCOUNTER — APPOINTMENT (OUTPATIENT)
Age: 69
End: 2024-03-29

## 2024-03-29 ENCOUNTER — OUTPATIENT (OUTPATIENT)
Dept: OUTPATIENT SERVICES | Facility: HOSPITAL | Age: 69
LOS: 1 days | End: 2024-03-29

## 2024-03-29 DIAGNOSIS — Z90.79 ACQUIRED ABSENCE OF OTHER GENITAL ORGAN(S): Chronic | ICD-10-CM

## 2024-03-29 DIAGNOSIS — I35.1 NONRHEUMATIC AORTIC (VALVE) INSUFFICIENCY: ICD-10-CM

## 2024-03-29 DIAGNOSIS — I34.0 NONRHEUMATIC MITRAL (VALVE) INSUFFICIENCY: ICD-10-CM

## 2024-04-15 ENCOUNTER — APPOINTMENT (OUTPATIENT)
Age: 69
End: 2024-04-15

## 2024-04-15 ENCOUNTER — OUTPATIENT (OUTPATIENT)
Dept: OUTPATIENT SERVICES | Facility: HOSPITAL | Age: 69
LOS: 1 days | End: 2024-04-15
Payer: MEDICARE

## 2024-04-15 DIAGNOSIS — Z90.79 ACQUIRED ABSENCE OF OTHER GENITAL ORGAN(S): Chronic | ICD-10-CM

## 2024-04-15 DIAGNOSIS — Z90.5 ACQUIRED ABSENCE OF KIDNEY: Chronic | ICD-10-CM

## 2024-04-15 DIAGNOSIS — I34.0 NONRHEUMATIC MITRAL (VALVE) INSUFFICIENCY: ICD-10-CM

## 2024-04-15 DIAGNOSIS — I35.1 NONRHEUMATIC AORTIC (VALVE) INSUFFICIENCY: ICD-10-CM

## 2024-04-15 PROCEDURE — 93798 PHYS/QHP OP CAR RHAB W/ECG: CPT

## 2024-04-16 DIAGNOSIS — I34.0 NONRHEUMATIC MITRAL (VALVE) INSUFFICIENCY: ICD-10-CM

## 2024-04-16 DIAGNOSIS — I35.1 NONRHEUMATIC AORTIC (VALVE) INSUFFICIENCY: ICD-10-CM

## 2024-04-17 ENCOUNTER — OUTPATIENT (OUTPATIENT)
Dept: OUTPATIENT SERVICES | Facility: HOSPITAL | Age: 69
LOS: 1 days | End: 2024-04-17

## 2024-04-17 ENCOUNTER — APPOINTMENT (OUTPATIENT)
Age: 69
End: 2024-04-17

## 2024-04-17 DIAGNOSIS — I35.1 NONRHEUMATIC AORTIC (VALVE) INSUFFICIENCY: ICD-10-CM

## 2024-04-17 DIAGNOSIS — Z90.5 ACQUIRED ABSENCE OF KIDNEY: Chronic | ICD-10-CM

## 2024-04-17 DIAGNOSIS — I34.0 NONRHEUMATIC MITRAL (VALVE) INSUFFICIENCY: ICD-10-CM

## 2024-04-17 DIAGNOSIS — Z90.79 ACQUIRED ABSENCE OF OTHER GENITAL ORGAN(S): Chronic | ICD-10-CM

## 2024-04-19 ENCOUNTER — OUTPATIENT (OUTPATIENT)
Dept: OUTPATIENT SERVICES | Facility: HOSPITAL | Age: 69
LOS: 1 days | End: 2024-04-19

## 2024-04-19 ENCOUNTER — APPOINTMENT (OUTPATIENT)
Age: 69
End: 2024-04-19

## 2024-04-19 DIAGNOSIS — I34.0 NONRHEUMATIC MITRAL (VALVE) INSUFFICIENCY: ICD-10-CM

## 2024-04-19 DIAGNOSIS — I35.1 NONRHEUMATIC AORTIC (VALVE) INSUFFICIENCY: ICD-10-CM

## 2024-04-22 ENCOUNTER — OUTPATIENT (OUTPATIENT)
Dept: OUTPATIENT SERVICES | Facility: HOSPITAL | Age: 69
LOS: 1 days | End: 2024-04-22

## 2024-04-22 ENCOUNTER — APPOINTMENT (OUTPATIENT)
Age: 69
End: 2024-04-22

## 2024-04-22 DIAGNOSIS — Z90.79 ACQUIRED ABSENCE OF OTHER GENITAL ORGAN(S): Chronic | ICD-10-CM

## 2024-04-22 DIAGNOSIS — Z90.5 ACQUIRED ABSENCE OF KIDNEY: Chronic | ICD-10-CM

## 2024-04-22 DIAGNOSIS — I34.0 NONRHEUMATIC MITRAL (VALVE) INSUFFICIENCY: ICD-10-CM

## 2024-04-22 DIAGNOSIS — I35.1 NONRHEUMATIC AORTIC (VALVE) INSUFFICIENCY: ICD-10-CM

## 2024-04-24 ENCOUNTER — APPOINTMENT (OUTPATIENT)
Age: 69
End: 2024-04-24

## 2024-04-26 ENCOUNTER — APPOINTMENT (OUTPATIENT)
Age: 69
End: 2024-04-26

## 2024-04-29 ENCOUNTER — APPOINTMENT (OUTPATIENT)
Age: 69
End: 2024-04-29

## 2024-05-01 ENCOUNTER — OUTPATIENT (OUTPATIENT)
Dept: OUTPATIENT SERVICES | Facility: HOSPITAL | Age: 69
LOS: 1 days | End: 2024-05-01
Payer: MEDICARE

## 2024-05-01 ENCOUNTER — APPOINTMENT (OUTPATIENT)
Age: 69
End: 2024-05-01

## 2024-05-01 DIAGNOSIS — I35.1 NONRHEUMATIC AORTIC (VALVE) INSUFFICIENCY: ICD-10-CM

## 2024-05-01 DIAGNOSIS — I34.0 NONRHEUMATIC MITRAL (VALVE) INSUFFICIENCY: ICD-10-CM

## 2024-05-01 DIAGNOSIS — Z90.79 ACQUIRED ABSENCE OF OTHER GENITAL ORGAN(S): Chronic | ICD-10-CM

## 2024-05-01 DIAGNOSIS — Z90.5 ACQUIRED ABSENCE OF KIDNEY: Chronic | ICD-10-CM

## 2024-05-01 PROCEDURE — 93798 PHYS/QHP OP CAR RHAB W/ECG: CPT | Mod: KX

## 2024-05-02 DIAGNOSIS — I35.1 NONRHEUMATIC AORTIC (VALVE) INSUFFICIENCY: ICD-10-CM

## 2024-05-02 DIAGNOSIS — I34.0 NONRHEUMATIC MITRAL (VALVE) INSUFFICIENCY: ICD-10-CM

## 2024-05-03 ENCOUNTER — APPOINTMENT (OUTPATIENT)
Age: 69
End: 2024-05-03

## 2024-05-03 ENCOUNTER — OUTPATIENT (OUTPATIENT)
Dept: OUTPATIENT SERVICES | Facility: HOSPITAL | Age: 69
LOS: 1 days | End: 2024-05-03

## 2024-05-03 DIAGNOSIS — I34.0 NONRHEUMATIC MITRAL (VALVE) INSUFFICIENCY: ICD-10-CM

## 2024-05-03 DIAGNOSIS — I35.1 NONRHEUMATIC AORTIC (VALVE) INSUFFICIENCY: ICD-10-CM

## 2024-05-03 DIAGNOSIS — Z90.5 ACQUIRED ABSENCE OF KIDNEY: Chronic | ICD-10-CM

## 2024-05-06 ENCOUNTER — APPOINTMENT (OUTPATIENT)
Age: 69
End: 2024-05-06

## 2024-05-08 ENCOUNTER — APPOINTMENT (OUTPATIENT)
Age: 69
End: 2024-05-08

## 2024-05-10 ENCOUNTER — APPOINTMENT (OUTPATIENT)
Age: 69
End: 2024-05-10

## 2024-05-13 ENCOUNTER — APPOINTMENT (OUTPATIENT)
Age: 69
End: 2024-05-13

## 2024-05-15 ENCOUNTER — OUTPATIENT (OUTPATIENT)
Dept: OUTPATIENT SERVICES | Facility: HOSPITAL | Age: 69
LOS: 1 days | End: 2024-05-15

## 2024-05-15 ENCOUNTER — APPOINTMENT (OUTPATIENT)
Age: 69
End: 2024-05-15

## 2024-05-15 DIAGNOSIS — I34.0 NONRHEUMATIC MITRAL (VALVE) INSUFFICIENCY: ICD-10-CM

## 2024-05-15 DIAGNOSIS — I35.1 NONRHEUMATIC AORTIC (VALVE) INSUFFICIENCY: ICD-10-CM

## 2024-05-15 DIAGNOSIS — Z90.79 ACQUIRED ABSENCE OF OTHER GENITAL ORGAN(S): Chronic | ICD-10-CM

## 2024-05-15 DIAGNOSIS — Z90.5 ACQUIRED ABSENCE OF KIDNEY: Chronic | ICD-10-CM

## 2024-05-17 ENCOUNTER — APPOINTMENT (OUTPATIENT)
Age: 69
End: 2024-05-17

## 2024-05-20 ENCOUNTER — APPOINTMENT (OUTPATIENT)
Age: 69
End: 2024-05-20

## 2024-05-20 ENCOUNTER — OUTPATIENT (OUTPATIENT)
Dept: OUTPATIENT SERVICES | Facility: HOSPITAL | Age: 69
LOS: 1 days | End: 2024-05-20

## 2024-05-20 DIAGNOSIS — I35.1 NONRHEUMATIC AORTIC (VALVE) INSUFFICIENCY: ICD-10-CM

## 2024-05-20 DIAGNOSIS — I34.0 NONRHEUMATIC MITRAL (VALVE) INSUFFICIENCY: ICD-10-CM

## 2024-05-20 DIAGNOSIS — Z90.5 ACQUIRED ABSENCE OF KIDNEY: Chronic | ICD-10-CM

## 2024-05-20 DIAGNOSIS — Z90.79 ACQUIRED ABSENCE OF OTHER GENITAL ORGAN(S): Chronic | ICD-10-CM

## 2024-05-22 ENCOUNTER — APPOINTMENT (OUTPATIENT)
Age: 69
End: 2024-05-22

## 2024-05-22 ENCOUNTER — OUTPATIENT (OUTPATIENT)
Dept: OUTPATIENT SERVICES | Facility: HOSPITAL | Age: 69
LOS: 1 days | End: 2024-05-22

## 2024-05-22 DIAGNOSIS — I34.0 NONRHEUMATIC MITRAL (VALVE) INSUFFICIENCY: ICD-10-CM

## 2024-05-22 DIAGNOSIS — I35.1 NONRHEUMATIC AORTIC (VALVE) INSUFFICIENCY: ICD-10-CM

## 2024-05-22 DIAGNOSIS — Z90.5 ACQUIRED ABSENCE OF KIDNEY: Chronic | ICD-10-CM

## 2024-05-22 DIAGNOSIS — Z90.79 ACQUIRED ABSENCE OF OTHER GENITAL ORGAN(S): Chronic | ICD-10-CM

## 2024-05-24 ENCOUNTER — APPOINTMENT (OUTPATIENT)
Age: 69
End: 2024-05-24

## 2024-05-29 ENCOUNTER — APPOINTMENT (OUTPATIENT)
Age: 69
End: 2024-05-29

## 2024-06-03 ENCOUNTER — APPOINTMENT (OUTPATIENT)
Age: 69
End: 2024-06-03

## 2024-06-03 ENCOUNTER — OUTPATIENT (OUTPATIENT)
Dept: OUTPATIENT SERVICES | Facility: HOSPITAL | Age: 69
LOS: 1 days | End: 2024-06-03
Payer: MEDICARE

## 2024-06-03 DIAGNOSIS — Z90.5 ACQUIRED ABSENCE OF KIDNEY: Chronic | ICD-10-CM

## 2024-06-03 DIAGNOSIS — Z90.79 ACQUIRED ABSENCE OF OTHER GENITAL ORGAN(S): Chronic | ICD-10-CM

## 2024-06-03 DIAGNOSIS — I35.1 NONRHEUMATIC AORTIC (VALVE) INSUFFICIENCY: ICD-10-CM

## 2024-06-03 PROCEDURE — 93798 PHYS/QHP OP CAR RHAB W/ECG: CPT

## 2024-06-04 DIAGNOSIS — I35.1 NONRHEUMATIC AORTIC (VALVE) INSUFFICIENCY: ICD-10-CM

## 2024-06-05 ENCOUNTER — APPOINTMENT (OUTPATIENT)
Age: 69
End: 2024-06-05

## 2024-06-05 ENCOUNTER — OUTPATIENT (OUTPATIENT)
Dept: OUTPATIENT SERVICES | Facility: HOSPITAL | Age: 69
LOS: 1 days | End: 2024-06-05

## 2024-06-05 DIAGNOSIS — Z90.79 ACQUIRED ABSENCE OF OTHER GENITAL ORGAN(S): Chronic | ICD-10-CM

## 2024-06-05 DIAGNOSIS — Z90.5 ACQUIRED ABSENCE OF KIDNEY: Chronic | ICD-10-CM

## 2024-06-05 DIAGNOSIS — I35.1 NONRHEUMATIC AORTIC (VALVE) INSUFFICIENCY: ICD-10-CM

## 2024-06-07 ENCOUNTER — APPOINTMENT (OUTPATIENT)
Age: 69
End: 2024-06-07

## 2024-06-10 ENCOUNTER — APPOINTMENT (OUTPATIENT)
Age: 69
End: 2024-06-10

## 2024-06-10 ENCOUNTER — OUTPATIENT (OUTPATIENT)
Dept: OUTPATIENT SERVICES | Facility: HOSPITAL | Age: 69
LOS: 1 days | End: 2024-06-10

## 2024-06-10 DIAGNOSIS — Z90.5 ACQUIRED ABSENCE OF KIDNEY: Chronic | ICD-10-CM

## 2024-06-10 DIAGNOSIS — I35.1 NONRHEUMATIC AORTIC (VALVE) INSUFFICIENCY: ICD-10-CM

## 2024-06-10 DIAGNOSIS — Z90.79 ACQUIRED ABSENCE OF OTHER GENITAL ORGAN(S): Chronic | ICD-10-CM

## 2024-06-12 ENCOUNTER — APPOINTMENT (OUTPATIENT)
Age: 69
End: 2024-06-12

## 2024-06-12 ENCOUNTER — OUTPATIENT (OUTPATIENT)
Dept: OUTPATIENT SERVICES | Facility: HOSPITAL | Age: 69
LOS: 1 days | End: 2024-06-12

## 2024-06-12 DIAGNOSIS — Z90.79 ACQUIRED ABSENCE OF OTHER GENITAL ORGAN(S): Chronic | ICD-10-CM

## 2024-06-12 DIAGNOSIS — Z90.5 ACQUIRED ABSENCE OF KIDNEY: Chronic | ICD-10-CM

## 2024-06-12 DIAGNOSIS — I35.1 NONRHEUMATIC AORTIC (VALVE) INSUFFICIENCY: ICD-10-CM

## 2024-06-14 ENCOUNTER — APPOINTMENT (OUTPATIENT)
Age: 69
End: 2024-06-14

## 2024-06-14 ENCOUNTER — OUTPATIENT (OUTPATIENT)
Dept: OUTPATIENT SERVICES | Facility: HOSPITAL | Age: 69
LOS: 1 days | End: 2024-06-14

## 2024-06-14 DIAGNOSIS — I35.1 NONRHEUMATIC AORTIC (VALVE) INSUFFICIENCY: ICD-10-CM

## 2024-06-14 DIAGNOSIS — Z90.5 ACQUIRED ABSENCE OF KIDNEY: Chronic | ICD-10-CM

## 2024-06-14 DIAGNOSIS — Z90.79 ACQUIRED ABSENCE OF OTHER GENITAL ORGAN(S): Chronic | ICD-10-CM

## 2024-06-17 ENCOUNTER — APPOINTMENT (OUTPATIENT)
Age: 69
End: 2024-06-17

## 2024-06-17 ENCOUNTER — OUTPATIENT (OUTPATIENT)
Dept: OUTPATIENT SERVICES | Facility: HOSPITAL | Age: 69
LOS: 1 days | End: 2024-06-17

## 2024-06-17 DIAGNOSIS — Z90.79 ACQUIRED ABSENCE OF OTHER GENITAL ORGAN(S): Chronic | ICD-10-CM

## 2024-06-17 DIAGNOSIS — I35.1 NONRHEUMATIC AORTIC (VALVE) INSUFFICIENCY: ICD-10-CM

## 2024-06-17 DIAGNOSIS — Z90.5 ACQUIRED ABSENCE OF KIDNEY: Chronic | ICD-10-CM

## 2024-06-19 ENCOUNTER — APPOINTMENT (OUTPATIENT)
Age: 69
End: 2024-06-19

## 2024-06-19 ENCOUNTER — OUTPATIENT (OUTPATIENT)
Dept: OUTPATIENT SERVICES | Facility: HOSPITAL | Age: 69
LOS: 1 days | End: 2024-06-19

## 2024-06-19 DIAGNOSIS — Z90.5 ACQUIRED ABSENCE OF KIDNEY: Chronic | ICD-10-CM

## 2024-06-19 DIAGNOSIS — I35.1 NONRHEUMATIC AORTIC (VALVE) INSUFFICIENCY: ICD-10-CM

## 2024-06-19 DIAGNOSIS — Z90.79 ACQUIRED ABSENCE OF OTHER GENITAL ORGAN(S): Chronic | ICD-10-CM

## 2024-06-21 ENCOUNTER — APPOINTMENT (OUTPATIENT)
Age: 69
End: 2024-06-21

## 2024-06-24 ENCOUNTER — APPOINTMENT (OUTPATIENT)
Age: 69
End: 2024-06-24

## 2024-06-26 ENCOUNTER — APPOINTMENT (OUTPATIENT)
Age: 69
End: 2024-06-26

## 2024-06-28 ENCOUNTER — APPOINTMENT (OUTPATIENT)
Age: 69
End: 2024-06-28

## 2024-07-06 NOTE — ED ADULT TRIAGE NOTE - PATIENT ON (OXYGEN DELIVERY METHOD)
You are seen today for a soft tissue amputation of your left ring finger.  We gave you antibiotics here in the hospital as well as a tetanus vaccine.  Please continue taking the antibiotics as prescribed.  Please return to the hospital if you develop any fevers, chills, loss of movement of your finger, red streaking on your finger, increased pain, or blackening of your finger.   room air

## 2024-08-21 NOTE — DISCHARGE NOTE PROVIDER - ATTENDING ATTESTATION STATEMENT
Call placed to the patient per [facility] referral.    Spoke with the Pt who stated he would like to wait, he will call [facility] back if needed    closed   I have personally seen and examined the patient. I have collaborated with and supervised the

## 2025-01-15 ENCOUNTER — APPOINTMENT (OUTPATIENT)
Dept: PULMONOLOGY | Facility: CLINIC | Age: 70
End: 2025-01-15
Payer: MEDICARE

## 2025-01-15 VITALS
HEIGHT: 70 IN | OXYGEN SATURATION: 98 % | HEART RATE: 71 BPM | WEIGHT: 203 LBS | SYSTOLIC BLOOD PRESSURE: 112 MMHG | DIASTOLIC BLOOD PRESSURE: 64 MMHG | BODY MASS INDEX: 29.06 KG/M2

## 2025-01-15 DIAGNOSIS — I35.0 NONRHEUMATIC AORTIC (VALVE) STENOSIS: ICD-10-CM

## 2025-01-15 DIAGNOSIS — G47.33 OBSTRUCTIVE SLEEP APNEA (ADULT) (PEDIATRIC): ICD-10-CM

## 2025-01-15 DIAGNOSIS — I34.0 NONRHEUMATIC MITRAL (VALVE) INSUFFICIENCY: ICD-10-CM

## 2025-01-15 DIAGNOSIS — J30.1 ALLERGIC RHINITIS DUE TO POLLEN: ICD-10-CM

## 2025-01-15 PROCEDURE — 99214 OFFICE O/P EST MOD 30 MIN: CPT
